# Patient Record
Sex: FEMALE | Race: WHITE | NOT HISPANIC OR LATINO | Employment: FULL TIME | URBAN - METROPOLITAN AREA
[De-identification: names, ages, dates, MRNs, and addresses within clinical notes are randomized per-mention and may not be internally consistent; named-entity substitution may affect disease eponyms.]

---

## 2021-10-04 ENCOUNTER — APPOINTMENT (OUTPATIENT)
Dept: RADIOLOGY | Facility: MEDICAL CENTER | Age: 26
DRG: 208 | End: 2021-10-04
Attending: INTERNAL MEDICINE
Payer: MEDICAID

## 2021-10-04 ENCOUNTER — HOSPITAL ENCOUNTER (INPATIENT)
Facility: MEDICAL CENTER | Age: 26
LOS: 7 days | DRG: 208 | End: 2021-10-11
Attending: EMERGENCY MEDICINE | Admitting: INTERNAL MEDICINE
Payer: MEDICAID

## 2021-10-04 ENCOUNTER — APPOINTMENT (OUTPATIENT)
Dept: RADIOLOGY | Facility: MEDICAL CENTER | Age: 26
DRG: 208 | End: 2021-10-04
Attending: EMERGENCY MEDICINE
Payer: MEDICAID

## 2021-10-04 DIAGNOSIS — J96.02 ACUTE RESPIRATORY FAILURE WITH HYPOXIA AND HYPERCAPNIA (HCC): ICD-10-CM

## 2021-10-04 DIAGNOSIS — J96.01 ACUTE RESPIRATORY FAILURE WITH HYPOXIA AND HYPERCAPNIA (HCC): ICD-10-CM

## 2021-10-04 DIAGNOSIS — J45.52 SEVERE PERSISTENT ASTHMA WITH STATUS ASTHMATICUS: ICD-10-CM

## 2021-10-04 DIAGNOSIS — J45.901 ASTHMA WITH ACUTE EXACERBATION, UNSPECIFIED ASTHMA SEVERITY, UNSPECIFIED WHETHER PERSISTENT: ICD-10-CM

## 2021-10-04 DIAGNOSIS — J96.01 ACUTE RESPIRATORY FAILURE WITH HYPOXIA (HCC): ICD-10-CM

## 2021-10-04 PROBLEM — J45.902 SEVERE ASTHMA WITH STATUS ASTHMATICUS: Status: ACTIVE | Noted: 2021-10-04

## 2021-10-04 LAB
ALBUMIN SERPL BCP-MCNC: 3.6 G/DL (ref 3.2–4.9)
ALBUMIN/GLOB SERPL: 0.9 G/DL
ALP SERPL-CCNC: 68 U/L (ref 30–99)
ALT SERPL-CCNC: 10 U/L (ref 2–50)
ANION GAP SERPL CALC-SCNC: 11 MMOL/L (ref 7–16)
AST SERPL-CCNC: 18 U/L (ref 12–45)
BASOPHILS # BLD AUTO: 0.3 % (ref 0–1.8)
BASOPHILS # BLD: 0.04 K/UL (ref 0–0.12)
BILIRUB SERPL-MCNC: <0.2 MG/DL (ref 0.1–1.5)
BUN SERPL-MCNC: 12 MG/DL (ref 8–22)
CALCIUM SERPL-MCNC: 8.5 MG/DL (ref 8.5–10.5)
CHLORIDE SERPL-SCNC: 104 MMOL/L (ref 96–112)
CO2 SERPL-SCNC: 22 MMOL/L (ref 20–33)
CREAT SERPL-MCNC: 0.73 MG/DL (ref 0.5–1.4)
EOSINOPHIL # BLD AUTO: 0.48 K/UL (ref 0–0.51)
EOSINOPHIL NFR BLD: 4 % (ref 0–6.9)
ERYTHROCYTE [DISTWIDTH] IN BLOOD BY AUTOMATED COUNT: 43.3 FL (ref 35.9–50)
GLOBULIN SER CALC-MCNC: 3.8 G/DL (ref 1.9–3.5)
GLUCOSE SERPL-MCNC: 163 MG/DL (ref 65–99)
HCG SERPL QL: NEGATIVE
HCT VFR BLD AUTO: 39.4 % (ref 37–47)
HGB BLD-MCNC: 12.9 G/DL (ref 12–16)
IMM GRANULOCYTES # BLD AUTO: 0.06 K/UL (ref 0–0.11)
IMM GRANULOCYTES NFR BLD AUTO: 0.5 % (ref 0–0.9)
LYMPHOCYTES # BLD AUTO: 4.11 K/UL (ref 1–4.8)
LYMPHOCYTES NFR BLD: 34.3 % (ref 22–41)
MCH RBC QN AUTO: 30.9 PG (ref 27–33)
MCHC RBC AUTO-ENTMCNC: 32.7 G/DL (ref 33.6–35)
MCV RBC AUTO: 94.3 FL (ref 81.4–97.8)
MONOCYTES # BLD AUTO: 0.96 K/UL (ref 0–0.85)
MONOCYTES NFR BLD AUTO: 8 % (ref 0–13.4)
NEUTROPHILS # BLD AUTO: 6.32 K/UL (ref 2–7.15)
NEUTROPHILS NFR BLD: 52.9 % (ref 44–72)
NRBC # BLD AUTO: 0 K/UL
NRBC BLD-RTO: 0 /100 WBC
PLATELET # BLD AUTO: 336 K/UL (ref 164–446)
PMV BLD AUTO: 9.1 FL (ref 9–12.9)
POTASSIUM SERPL-SCNC: 4.7 MMOL/L (ref 3.6–5.5)
PROT SERPL-MCNC: 7.4 G/DL (ref 6–8.2)
RBC # BLD AUTO: 4.18 M/UL (ref 4.2–5.4)
SODIUM SERPL-SCNC: 137 MMOL/L (ref 135–145)
WBC # BLD AUTO: 12 K/UL (ref 4.8–10.8)

## 2021-10-04 PROCEDURE — 82803 BLOOD GASES ANY COMBINATION: CPT

## 2021-10-04 PROCEDURE — 31500 INSERT EMERGENCY AIRWAY: CPT

## 2021-10-04 PROCEDURE — 700101 HCHG RX REV CODE 250: Performed by: INTERNAL MEDICINE

## 2021-10-04 PROCEDURE — 94660 CPAP INITIATION&MGMT: CPT

## 2021-10-04 PROCEDURE — 700111 HCHG RX REV CODE 636 W/ 250 OVERRIDE (IP): Performed by: EMERGENCY MEDICINE

## 2021-10-04 PROCEDURE — 85025 COMPLETE CBC W/AUTO DIFF WBC: CPT

## 2021-10-04 PROCEDURE — 99152 MOD SED SAME PHYS/QHP 5/>YRS: CPT

## 2021-10-04 PROCEDURE — 700102 HCHG RX REV CODE 250 W/ 637 OVERRIDE(OP): Performed by: INTERNAL MEDICINE

## 2021-10-04 PROCEDURE — 5A09357 ASSISTANCE WITH RESPIRATORY VENTILATION, LESS THAN 24 CONSECUTIVE HOURS, CONTINUOUS POSITIVE AIRWAY PRESSURE: ICD-10-PCS | Performed by: INTERNAL MEDICINE

## 2021-10-04 PROCEDURE — 99153 MOD SED SAME PHYS/QHP EA: CPT

## 2021-10-04 PROCEDURE — 94640 AIRWAY INHALATION TREATMENT: CPT

## 2021-10-04 PROCEDURE — 700111 HCHG RX REV CODE 636 W/ 250 OVERRIDE (IP)

## 2021-10-04 PROCEDURE — 84703 CHORIONIC GONADOTROPIN ASSAY: CPT

## 2021-10-04 PROCEDURE — 94002 VENT MGMT INPAT INIT DAY: CPT

## 2021-10-04 PROCEDURE — 80053 COMPREHEN METABOLIC PANEL: CPT

## 2021-10-04 PROCEDURE — 770022 HCHG ROOM/CARE - ICU (200)

## 2021-10-04 PROCEDURE — 96372 THER/PROPH/DIAG INJ SC/IM: CPT

## 2021-10-04 PROCEDURE — 700101 HCHG RX REV CODE 250

## 2021-10-04 PROCEDURE — 99291 CRITICAL CARE FIRST HOUR: CPT | Performed by: INTERNAL MEDICINE

## 2021-10-04 PROCEDURE — 99292 CRITICAL CARE ADDL 30 MIN: CPT | Performed by: INTERNAL MEDICINE

## 2021-10-04 PROCEDURE — A9270 NON-COVERED ITEM OR SERVICE: HCPCS | Performed by: INTERNAL MEDICINE

## 2021-10-04 PROCEDURE — 99291 CRITICAL CARE FIRST HOUR: CPT

## 2021-10-04 PROCEDURE — 700111 HCHG RX REV CODE 636 W/ 250 OVERRIDE (IP): Performed by: INTERNAL MEDICINE

## 2021-10-04 PROCEDURE — 304155 HCHG HELIOX Q2H

## 2021-10-04 PROCEDURE — 700105 HCHG RX REV CODE 258: Performed by: EMERGENCY MEDICINE

## 2021-10-04 PROCEDURE — 71045 X-RAY EXAM CHEST 1 VIEW: CPT

## 2021-10-04 PROCEDURE — 700105 HCHG RX REV CODE 258: Performed by: INTERNAL MEDICINE

## 2021-10-04 PROCEDURE — 94760 N-INVAS EAR/PLS OXIMETRY 1: CPT

## 2021-10-04 PROCEDURE — 700101 HCHG RX REV CODE 250: Performed by: EMERGENCY MEDICINE

## 2021-10-04 PROCEDURE — 96374 THER/PROPH/DIAG INJ IV PUSH: CPT

## 2021-10-04 RX ORDER — PROMETHAZINE HYDROCHLORIDE 25 MG/1
12.5-25 TABLET ORAL EVERY 4 HOURS PRN
Status: DISCONTINUED | OUTPATIENT
Start: 2021-10-04 | End: 2021-10-09

## 2021-10-04 RX ORDER — MIDAZOLAM HYDROCHLORIDE 1 MG/ML
2-4 INJECTION INTRAMUSCULAR; INTRAVENOUS EVERY 4 HOURS PRN
Status: DISCONTINUED | OUTPATIENT
Start: 2021-10-04 | End: 2021-10-06

## 2021-10-04 RX ORDER — MAGNESIUM SULFATE HEPTAHYDRATE 40 MG/ML
2 INJECTION, SOLUTION INTRAVENOUS ONCE
Status: COMPLETED | OUTPATIENT
Start: 2021-10-04 | End: 2021-10-04

## 2021-10-04 RX ORDER — LORAZEPAM 2 MG/ML
INJECTION INTRAMUSCULAR
Status: COMPLETED
Start: 2021-10-04 | End: 2021-10-04

## 2021-10-04 RX ORDER — SODIUM CHLORIDE 9 MG/ML
1000 INJECTION, SOLUTION INTRAVENOUS ONCE
Status: COMPLETED | OUTPATIENT
Start: 2021-10-04 | End: 2021-10-04

## 2021-10-04 RX ORDER — LORAZEPAM 2 MG/ML
0.5 INJECTION INTRAMUSCULAR ONCE
Status: COMPLETED | OUTPATIENT
Start: 2021-10-04 | End: 2021-10-04

## 2021-10-04 RX ORDER — METHYLPREDNISOLONE SODIUM SUCCINATE 125 MG/2ML
62.5 INJECTION, POWDER, LYOPHILIZED, FOR SOLUTION INTRAMUSCULAR; INTRAVENOUS EVERY 6 HOURS
Status: DISCONTINUED | OUTPATIENT
Start: 2021-10-04 | End: 2021-10-09

## 2021-10-04 RX ORDER — CALCIUM CHLORIDE 100 MG/ML
INJECTION INTRAVENOUS; INTRAVENTRICULAR
Status: ACTIVE
Start: 2021-10-04 | End: 2021-10-05

## 2021-10-04 RX ORDER — ACETAMINOPHEN 325 MG/1
650 TABLET ORAL EVERY 6 HOURS PRN
Status: DISCONTINUED | OUTPATIENT
Start: 2021-10-04 | End: 2021-10-11 | Stop reason: HOSPADM

## 2021-10-04 RX ORDER — AMOXICILLIN 250 MG
2 CAPSULE ORAL 2 TIMES DAILY
Status: DISCONTINUED | OUTPATIENT
Start: 2021-10-04 | End: 2021-10-04

## 2021-10-04 RX ORDER — KETAMINE HYDROCHLORIDE 50 MG/ML
INJECTION, SOLUTION INTRAMUSCULAR; INTRAVENOUS
Status: COMPLETED
Start: 2021-10-04 | End: 2021-10-04

## 2021-10-04 RX ORDER — IPRATROPIUM BROMIDE AND ALBUTEROL SULFATE 2.5; .5 MG/3ML; MG/3ML
SOLUTION RESPIRATORY (INHALATION)
Status: COMPLETED
Start: 2021-10-04 | End: 2021-10-04

## 2021-10-04 RX ORDER — GUAIFENESIN/DEXTROMETHORPHAN 100-10MG/5
10 SYRUP ORAL EVERY 6 HOURS PRN
Status: DISCONTINUED | OUTPATIENT
Start: 2021-10-04 | End: 2021-10-11 | Stop reason: HOSPADM

## 2021-10-04 RX ORDER — LEVALBUTEROL INHALATION SOLUTION 1.25 MG/3ML
1.25 SOLUTION RESPIRATORY (INHALATION)
Status: COMPLETED | OUTPATIENT
Start: 2021-10-04 | End: 2021-10-04

## 2021-10-04 RX ORDER — GUAIFENESIN, PSEUDOEPHEDRINE HYDROCHLORIDE 600; 60 MG/1; MG/1
1 TABLET, EXTENDED RELEASE ORAL EVERY 12 HOURS
COMMUNITY

## 2021-10-04 RX ORDER — ROCURONIUM BROMIDE 10 MG/ML
0.6 INJECTION, SOLUTION INTRAVENOUS ONCE
Status: COMPLETED | OUTPATIENT
Start: 2021-10-04 | End: 2021-10-05

## 2021-10-04 RX ORDER — KETAMINE HYDROCHLORIDE 50 MG/ML
0.5 INJECTION, SOLUTION INTRAMUSCULAR; INTRAVENOUS
Status: COMPLETED | OUTPATIENT
Start: 2021-10-04 | End: 2021-10-04

## 2021-10-04 RX ORDER — IPRATROPIUM BROMIDE AND ALBUTEROL SULFATE 2.5; .5 MG/3ML; MG/3ML
3 SOLUTION RESPIRATORY (INHALATION)
Status: COMPLETED | OUTPATIENT
Start: 2021-10-04 | End: 2021-10-04

## 2021-10-04 RX ORDER — MIDAZOLAM HYDROCHLORIDE 1 MG/ML
INJECTION INTRAMUSCULAR; INTRAVENOUS
Status: COMPLETED
Start: 2021-10-04 | End: 2021-10-04

## 2021-10-04 RX ORDER — KETAMINE HYDROCHLORIDE 50 MG/ML
50 INJECTION, SOLUTION INTRAMUSCULAR; INTRAVENOUS ONCE
Status: COMPLETED | OUTPATIENT
Start: 2021-10-04 | End: 2021-10-04

## 2021-10-04 RX ORDER — MIDAZOLAM HYDROCHLORIDE 1 MG/ML
1 INJECTION INTRAMUSCULAR; INTRAVENOUS ONCE
Status: DISCONTINUED | OUTPATIENT
Start: 2021-10-04 | End: 2021-10-04

## 2021-10-04 RX ORDER — FAMOTIDINE 20 MG/1
20 TABLET, FILM COATED ORAL EVERY 12 HOURS
Status: DISCONTINUED | OUTPATIENT
Start: 2021-10-04 | End: 2021-10-07

## 2021-10-04 RX ORDER — BISACODYL 10 MG
10 SUPPOSITORY, RECTAL RECTAL
Status: DISCONTINUED | OUTPATIENT
Start: 2021-10-04 | End: 2021-10-11 | Stop reason: HOSPADM

## 2021-10-04 RX ORDER — POLYETHYLENE GLYCOL 3350 17 G/17G
1 POWDER, FOR SOLUTION ORAL
Status: DISCONTINUED | OUTPATIENT
Start: 2021-10-04 | End: 2021-10-04

## 2021-10-04 RX ORDER — ONDANSETRON 4 MG/1
4 TABLET, ORALLY DISINTEGRATING ORAL EVERY 4 HOURS PRN
Status: DISCONTINUED | OUTPATIENT
Start: 2021-10-04 | End: 2021-10-11 | Stop reason: HOSPADM

## 2021-10-04 RX ORDER — PROCHLORPERAZINE EDISYLATE 5 MG/ML
5-10 INJECTION INTRAMUSCULAR; INTRAVENOUS EVERY 4 HOURS PRN
Status: DISCONTINUED | OUTPATIENT
Start: 2021-10-04 | End: 2021-10-09

## 2021-10-04 RX ORDER — ONDANSETRON 2 MG/ML
4 INJECTION INTRAMUSCULAR; INTRAVENOUS EVERY 4 HOURS PRN
Status: DISCONTINUED | OUTPATIENT
Start: 2021-10-04 | End: 2021-10-11 | Stop reason: HOSPADM

## 2021-10-04 RX ORDER — DEXMEDETOMIDINE HYDROCHLORIDE 4 UG/ML
.1-1.5 INJECTION, SOLUTION INTRAVENOUS CONTINUOUS
Status: DISCONTINUED | OUTPATIENT
Start: 2021-10-04 | End: 2021-10-04

## 2021-10-04 RX ORDER — AMOXICILLIN 250 MG
2 CAPSULE ORAL 2 TIMES DAILY
Status: DISCONTINUED | OUTPATIENT
Start: 2021-10-05 | End: 2021-10-11 | Stop reason: HOSPADM

## 2021-10-04 RX ORDER — POLYETHYLENE GLYCOL 3350 17 G/17G
1 POWDER, FOR SOLUTION ORAL
Status: DISCONTINUED | OUTPATIENT
Start: 2021-10-04 | End: 2021-10-11 | Stop reason: HOSPADM

## 2021-10-04 RX ORDER — PROMETHAZINE HYDROCHLORIDE 25 MG/1
12.5-25 SUPPOSITORY RECTAL EVERY 4 HOURS PRN
Status: DISCONTINUED | OUTPATIENT
Start: 2021-10-04 | End: 2021-10-09

## 2021-10-04 RX ORDER — ROCURONIUM BROMIDE 10 MG/ML
0.6 INJECTION, SOLUTION INTRAVENOUS
Status: DISCONTINUED | OUTPATIENT
Start: 2021-10-04 | End: 2021-10-06

## 2021-10-04 RX ORDER — EPINEPHRINE 1 MG/ML(1)
0.3 AMPUL (ML) INJECTION ONCE
Status: COMPLETED | OUTPATIENT
Start: 2021-10-04 | End: 2021-10-04

## 2021-10-04 RX ORDER — BISACODYL 10 MG
10 SUPPOSITORY, RECTAL RECTAL
Status: DISCONTINUED | OUTPATIENT
Start: 2021-10-04 | End: 2021-10-04

## 2021-10-04 RX ORDER — ALBUTEROL SULFATE 90 UG/1
2 AEROSOL, METERED RESPIRATORY (INHALATION)
Status: DISCONTINUED | OUTPATIENT
Start: 2021-10-04 | End: 2021-10-11 | Stop reason: HOSPADM

## 2021-10-04 RX ADMIN — LORAZEPAM 0.5 MG: 2 INJECTION INTRAMUSCULAR at 16:45

## 2021-10-04 RX ADMIN — LORAZEPAM 0.5 MG: 2 INJECTION INTRAMUSCULAR; INTRAVENOUS at 16:45

## 2021-10-04 RX ADMIN — ALBUTEROL SULFATE 10 MG/HR: 5 SOLUTION RESPIRATORY (INHALATION) at 18:46

## 2021-10-04 RX ADMIN — MIDAZOLAM HYDROCHLORIDE 4 MG: 1 INJECTION INTRAMUSCULAR; INTRAVENOUS at 23:54

## 2021-10-04 RX ADMIN — Medication 2 MG/HR: at 23:56

## 2021-10-04 RX ADMIN — EPINEPHRINE 0.3 MG: 1 INJECTION INTRAMUSCULAR; INTRAVENOUS; SUBCUTANEOUS at 16:06

## 2021-10-04 RX ADMIN — SODIUM CHLORIDE 1000 ML: 9 INJECTION, SOLUTION INTRAVENOUS at 16:45

## 2021-10-04 RX ADMIN — FAMOTIDINE 20 MG: 10 INJECTION INTRAVENOUS at 23:55

## 2021-10-04 RX ADMIN — MINERAL OIL, PETROLATUM 1 APPLICATION: 425; 573 OINTMENT OPHTHALMIC at 23:55

## 2021-10-04 RX ADMIN — ALBUTEROL SULFATE 2 PUFF: 90 AEROSOL, METERED RESPIRATORY (INHALATION) at 20:09

## 2021-10-04 RX ADMIN — METHYLPREDNISOLONE SODIUM SUCCINATE 62.5 MG: 125 INJECTION, POWDER, FOR SOLUTION INTRAMUSCULAR; INTRAVENOUS at 23:55

## 2021-10-04 RX ADMIN — KETAMINE HYDROCHLORIDE 50 MG: 50 INJECTION INTRAMUSCULAR; INTRAVENOUS at 21:29

## 2021-10-04 RX ADMIN — LEVALBUTEROL 2.5 MG: 1.25 SOLUTION RESPIRATORY (INHALATION) at 16:14

## 2021-10-04 RX ADMIN — SODIUM CHLORIDE 1 MG/KG/HR: 9 INJECTION, SOLUTION INTRAVENOUS at 20:15

## 2021-10-04 RX ADMIN — LORAZEPAM 0.5 MG: 2 INJECTION INTRAMUSCULAR; INTRAVENOUS at 21:54

## 2021-10-04 RX ADMIN — ALBUTEROL SULFATE 10 MG/HR: 5 SOLUTION RESPIRATORY (INHALATION) at 17:14

## 2021-10-04 RX ADMIN — KETAMINE HYDROCHLORIDE 50 MG: 50 INJECTION, SOLUTION INTRAMUSCULAR; INTRAVENOUS at 21:29

## 2021-10-04 RX ADMIN — MIDAZOLAM HYDROCHLORIDE 4 MG: 1 INJECTION, SOLUTION INTRAMUSCULAR; INTRAVENOUS at 23:54

## 2021-10-04 RX ADMIN — IPRATROPIUM BROMIDE AND ALBUTEROL SULFATE 3 ML: .5; 2.5 SOLUTION RESPIRATORY (INHALATION) at 16:43

## 2021-10-04 RX ADMIN — IPRATROPIUM BROMIDE AND ALBUTEROL SULFATE 3 ML: .5; 2.5 SOLUTION RESPIRATORY (INHALATION) at 17:03

## 2021-10-04 RX ADMIN — METHYLPREDNISOLONE SODIUM SUCCINATE 62.5 MG: 125 INJECTION, POWDER, FOR SOLUTION INTRAMUSCULAR; INTRAVENOUS at 18:40

## 2021-10-04 RX ADMIN — MAGNESIUM SULFATE HEPTAHYDRATE 2 G: 40 INJECTION, SOLUTION INTRAVENOUS at 18:40

## 2021-10-04 RX ADMIN — KETAMINE HYDROCHLORIDE 33 MG: 50 INJECTION INTRAMUSCULAR; INTRAVENOUS at 18:40

## 2021-10-04 RX ADMIN — DEXMEDETOMIDINE 0.6 MCG/KG/HR: 200 INJECTION, SOLUTION INTRAVENOUS at 22:20

## 2021-10-04 ASSESSMENT — LIFESTYLE VARIABLES
EVER HAD A DRINK FIRST THING IN THE MORNING TO STEADY YOUR NERVES TO GET RID OF A HANGOVER: NO
HAVE PEOPLE ANNOYED YOU BY CRITICIZING YOUR DRINKING: NO
CONSUMPTION TOTAL: POSITIVE
AVERAGE NUMBER OF DAYS PER WEEK YOU HAVE A DRINK CONTAINING ALCOHOL: 1
HOW MANY TIMES IN THE PAST YEAR HAVE YOU HAD 5 OR MORE DRINKS IN A DAY: 1
HAVE YOU EVER FELT YOU SHOULD CUT DOWN ON YOUR DRINKING: NO
TOTAL SCORE: 0
ON A TYPICAL DAY WHEN YOU DRINK ALCOHOL HOW MANY DRINKS DO YOU HAVE: 1
DOES PATIENT WANT TO STOP DRINKING: NO
EVER FELT BAD OR GUILTY ABOUT YOUR DRINKING: NO
ALCOHOL_USE: YES

## 2021-10-04 ASSESSMENT — COGNITIVE AND FUNCTIONAL STATUS - GENERAL
DAILY ACTIVITIY SCORE: 24
MOBILITY SCORE: 24
SUGGESTED CMS G CODE MODIFIER DAILY ACTIVITY: CH
SUGGESTED CMS G CODE MODIFIER MOBILITY: CH

## 2021-10-04 ASSESSMENT — PATIENT HEALTH QUESTIONNAIRE - PHQ9
2. FEELING DOWN, DEPRESSED, IRRITABLE, OR HOPELESS: NOT AT ALL
1. LITTLE INTEREST OR PLEASURE IN DOING THINGS: NOT AT ALL
SUM OF ALL RESPONSES TO PHQ9 QUESTIONS 1 AND 2: 0

## 2021-10-04 ASSESSMENT — COPD QUESTIONNAIRES
DURING THE PAST 4 WEEKS HOW MUCH DID YOU FEEL SHORT OF BREATH: NONE/LITTLE OF THE TIME
DO YOU EVER COUGH UP ANY MUCUS OR PHLEGM?: NO/ONLY WITH OCCASIONAL COLDS OR INFECTIONS
COPD SCREENING SCORE: 2
HAVE YOU SMOKED AT LEAST 100 CIGARETTES IN YOUR ENTIRE LIFE: YES

## 2021-10-04 ASSESSMENT — PULMONARY FUNCTION TESTS
EPAP_CMH2O: 6
EPAP_CMH2O: 6

## 2021-10-04 NOTE — ED TRIAGE NOTES
Chief Complaint   Patient presents with   • Asthma     history of, 80% on room air. took albuterol x5 at home and given 3 albuterol by EMS. 2 duonebs given by EMS   • Shortness of Breath     increasing since last monday     Pt BIBA from home for an acute asthma exacerbation. Pt on 15L NRB upon arrival with increased work of breathing and tachypnea. Pt reports having cold like symptoms since last Monday and reports receiving first dose of covid pfizer vaccine on 9/25.  ERP and respiratory called to bedside. Pt placed on BIPAP.

## 2021-10-04 NOTE — ED PROVIDER NOTES
ED Provider Note    Scribed for Anna Humphrey M.D. by Real Oseguera. 10/4/2021, 4:22 PM.    Primary care provider: No primary care provider noted  Means of arrival: Ambulance   History obtained from: EMS and Patient  History limited by: Clinical Condition     CHIEF COMPLAINT  Chief Complaint   Patient presents with   • Asthma     history of, 80% on room air. took albuterol x5 at home and given 3 albuterol by EMS. 2 duonebs given by EMS   • Shortness of Breath     increasing since last monday     ROGER Jimenez is a 26 y.o. female with history of asthma who presents to the Emergency Department by ambulance for evaluation of worsening severe asthma exacerbation onset about 1 week ago. Per EMS, the patient was short of breath, diffusely mottled and hypoxic on room air at 40% upon arrival. She had self medicated with 5 doses of her albuterol inhaler at home today, and EMS treated the patient with 2 g magnesium, 3 doses albuterol, 2 duoneb, and 125 mg Solu-Medrol en route. She has also experienced cold-like symptoms for about a week. She notes she received her first Pfizer COVID vaccine on 9/22/21.     History limited by: Clinical Condition     REVIEW OF SYSTEMS  Pertinent positives include shortness of breath, mottled skin, hypoxia, cold-like symptoms.     ROS limited by: Clinical Condition     PAST MEDICAL HISTORY   Asthma    SURGICAL HISTORY  patient denies any surgical history    SOCIAL HISTORY  Social History     Tobacco Use   • Smoking status: Never Smoker   • Smokeless tobacco: Never Used   Substance Use Topics   • Alcohol use: Never   • Drug use: Never      Social History     Substance and Sexual Activity   Drug Use Never       FAMILY HISTORY  History reviewed. No pertinent family history.    CURRENT MEDICATIONS  Home Medications     Reviewed by Luly Carvalho (Pharmacy Tech) on 10/04/21 at 2810  Med List Status: Complete   Medication Last Dose Status   ALBUTEROL SULFATE PO 10/4/2021 Active  "  pseudoephedrine-guaifenesin (MUCINEX D)  MG per tablet 10/3/2021 Active                ALLERGIES  No Known Allergies    PHYSICAL EXAM  VITAL SIGNS: /68   Pulse (!) 153   Temp 37.7 °C (99.8 °F) (Temporal)   Resp (!) 38   Ht 1.626 m (5' 4\")   Wt 65.8 kg (145 lb)   SpO2 95%   BMI 24.89 kg/m²   Constitutional: Alert. Severe respiratory distress.   HENT: No signs of trauma, Bilateral external ears normal, Nose normal.   Eyes: Pupils are equal and reactive, Conjunctiva normal, Non-icteric.   Neck: Normal range of motion, No tenderness, Supple, No stridor.   Cardiovascular: Tachycardic rate and normal rhythm, no murmurs.   Thorax & Lungs: Severe respiratory distress, decreased air movement, inspiratory and expiratory wheezing.   Skin: Warm, Dry, No erythema, No rash.   Musculoskeletal:  No major deformities noted.   Neurologic: Alert, moving all extremities without difficulty, no focal deficits.    LABS  Labs Reviewed   CBC WITH DIFFERENTIAL - Abnormal; Notable for the following components:       Result Value    WBC 12.0 (*)     RBC 4.18 (*)     MCHC 32.7 (*)     Monos (Absolute) 0.96 (*)     All other components within normal limits   COMP METABOLIC PANEL - Abnormal; Notable for the following components:    Glucose 163 (*)     Globulin 3.8 (*)     All other components within normal limits   HCG QUAL SERUM   ESTIMATED GFR   RESPIRATORY PANEL, PCR (W/SARS COV-2)   URINALYSIS   HCG QUALITATIVE UR   URINE DRUG SCREEN   CBC WITHOUT DIFFERENTIAL   COMP METABOLIC PANEL   MAGNESIUM   PHOSPHORUS   POCT VENOUS BLOOD GAS     All labs reviewed by me.    RADIOLOGY  DX-CHEST-LIMITED (1 VIEW)   Final Result      No acute cardiopulmonary disease.        The radiologist's interpretation of all radiological studies have been reviewed by me.    CRITICAL CARE NOTE:  Critical care time was provided for 45 minutes minutes exclusive of separately billable procedures and treating other patients. This involved direct bedside " patient care, speaking with family members, review of past medical records, reviewing the results of the laboratory and diagnostic studies, consulting with other physicians, as well as evaluating the effectiveness of the therapy instituted as described.      COURSE & MEDICAL DECISION MAKING  Pertinent Labs & Imaging studies reviewed. (See chart for details)    Differential diagnoses include but are not limited to: Respiratory Failure, asthma exacerbation    3:55 PM - Patient seen and examined at bedside. Patient was placed on 15 L non-rebreather upon arrival, then changed to BiPAP. Patient was be treated with epinephrine 0.3 mg IM, Xopenex 1.25 mg nebulizer solution, albuterol nebulizer solution, Ativan 1 mg, and 1 L NS IV. Ordered DX-Chest, CBC with diff, CMP to evaluate her symptoms.     4:37 PM - The patient will be treated with Duoneb nebulizer solution.     4:52 PM - Patient was started on high flow Heliox.     4:58 PM Paged Intensivist.     5:07 PM I discussed the patient's case and the above findings with Dr. Ham (Intensivist) who will assess the patient for hospitalization.     HYDRATION: Based on the patient's presentation of Tachycardia the patient was given IV fluids. IV Hydration was used because oral hydration was not adequate alone. Upon recheck following hydration, the patient was improved.    Decision Making:  This is a 26 y.o. year old female who presents with severe respiratory distress likely secondary to asthma exacerbation.  Patient arrived in extremis she was really moving minimal air and had significant diffuse wheezing.    She was initially placed on BiPAP she was given 0.3 mils of IM epi as she had already been given Solu-Medrol and magnesium.  The epinephrine really did not seem to help much.  She was placed on BiPAP initially for her respiratory distress to help her work of breathing and delivered Xopenex nebulizer treatments x2.  She had some improvement with her oxygenation and work  of breathing however her respiratory rate was still in the 50s to 60s.  She was therefore then switched to high flow nasal cannula with heliox.  Additional continuous nebulizers were delivered through this.  She had some significant improvement of her respiratory rate down to the high 20s low 30s on this with sats in the mid 90s.  She did well on this per little while and then had increased wheezing and tightness again with sats dropping into 88%.  Dr. Ham ordered an albuterol syringe to give continuous albuterol.  Patient is critically ill at this time she will require ICU level admission.    DISPOSITION:  Patient will be hospitalized by Dr. Ham in critical condition.     FINAL IMPRESSION  1. Asthma with acute exacerbation, unspecified asthma severity, unspecified whether persistent    2. Acute respiratory failure with hypoxia (HCC)               This dictation has been created using voice recognition software and/or scribes. The accuracy of the dictation is limited by the abilities of the software and the expertise of the scribes. I expect there may be some errors of grammar and possibly content. I made every attempt to manually correct the errors within my dictation. However, errors related to voice recognition software and/or scribes may still exist and should be interpreted within the appropriate context.     Real WISDOM (Scribe), am scribing for, and in the presence of, Anna Humphrey M.D..    Electronically signed by: Real Oseguera (Scribe), 10/4/2021    Anna WISDOM M.D. personally performed the services described in this documentation, as scribed by Real Oseguera in my presence, and it is both accurate and complete.    The note accurately reflects work and decisions made by me.  Anna Humphrey M.D.  10/4/2021  6:14 PM

## 2021-10-05 ENCOUNTER — APPOINTMENT (OUTPATIENT)
Dept: RADIOLOGY | Facility: MEDICAL CENTER | Age: 26
DRG: 208 | End: 2021-10-05
Attending: INTERNAL MEDICINE
Payer: MEDICAID

## 2021-10-05 PROBLEM — J96.01 ACUTE RESPIRATORY FAILURE WITH HYPOXIA AND HYPERCAPNIA (HCC): Status: ACTIVE | Noted: 2021-10-05

## 2021-10-05 PROBLEM — J96.02 ACUTE RESPIRATORY FAILURE WITH HYPOXIA AND HYPERCAPNIA (HCC): Status: ACTIVE | Noted: 2021-10-05

## 2021-10-05 LAB
ALBUMIN SERPL BCP-MCNC: 3.9 G/DL (ref 3.2–4.9)
ALBUMIN/GLOB SERPL: 1 G/DL
ALP SERPL-CCNC: 69 U/L (ref 30–99)
ALT SERPL-CCNC: 12 U/L (ref 2–50)
AMPHET UR QL SCN: POSITIVE
ANION GAP SERPL CALC-SCNC: 11 MMOL/L (ref 7–16)
ANION GAP SERPL CALC-SCNC: 16 MMOL/L (ref 7–16)
APPEARANCE UR: ABNORMAL
AST SERPL-CCNC: 22 U/L (ref 12–45)
BACTERIA #/AREA URNS HPF: ABNORMAL /HPF
BARBITURATES UR QL SCN: NEGATIVE
BASE EXCESS BLDA CALC-SCNC: -10 MMOL/L (ref -4–3)
BASE EXCESS BLDA CALC-SCNC: -11 MMOL/L (ref -4–3)
BASE EXCESS BLDA CALC-SCNC: -13 MMOL/L (ref -4–3)
BASE EXCESS BLDA CALC-SCNC: -6 MMOL/L (ref -4–3)
BASE EXCESS BLDA CALC-SCNC: -7 MMOL/L (ref -4–3)
BASE EXCESS BLDV CALC-SCNC: -10 MMOL/L (ref -4–3)
BENZODIAZ UR QL SCN: POSITIVE
BILIRUB SERPL-MCNC: <0.2 MG/DL (ref 0.1–1.5)
BILIRUB UR QL STRIP.AUTO: NEGATIVE
BODY TEMPERATURE: ABNORMAL DEGREES
BREATHS SETTING VENT: 24
BREATHS SETTING VENT: 28
BREATHS SETTING VENT: 28
BREATHS SETTING VENT: 32
BUN SERPL-MCNC: 10 MG/DL (ref 8–22)
BUN SERPL-MCNC: 16 MG/DL (ref 8–22)
BZE UR QL SCN: NEGATIVE
CALCIUM SERPL-MCNC: 7.9 MG/DL (ref 8.5–10.5)
CALCIUM SERPL-MCNC: 8.1 MG/DL (ref 8.5–10.5)
CANNABINOIDS UR QL SCN: NEGATIVE
CHLORIDE SERPL-SCNC: 109 MMOL/L (ref 96–112)
CHLORIDE SERPL-SCNC: 112 MMOL/L (ref 96–112)
CO2 BLDA-SCNC: 18 MMOL/L (ref 20–33)
CO2 BLDA-SCNC: 19 MMOL/L (ref 20–33)
CO2 BLDA-SCNC: 21 MMOL/L (ref 20–33)
CO2 BLDA-SCNC: 22 MMOL/L (ref 20–33)
CO2 BLDA-SCNC: 24 MMOL/L (ref 20–33)
CO2 BLDV-SCNC: 21 MMOL/L (ref 20–33)
CO2 SERPL-SCNC: 15 MMOL/L (ref 20–33)
CO2 SERPL-SCNC: 18 MMOL/L (ref 20–33)
COLOR UR: YELLOW
CREAT SERPL-MCNC: 0.73 MG/DL (ref 0.5–1.4)
CREAT SERPL-MCNC: 0.78 MG/DL (ref 0.5–1.4)
DELSYS IDSYS: ABNORMAL
END TIDAL CARBON DIOXIDE IECO2: 19 MMHG
END TIDAL CARBON DIOXIDE IECO2: 23 MMHG
END TIDAL CARBON DIOXIDE IECO2: 30 MMHG
END TIDAL CARBON DIOXIDE IECO2: 31 MMHG
END TIDAL CARBON DIOXIDE IECO2: 37 MMHG
EPI CELLS #/AREA URNS HPF: ABNORMAL /HPF
ERYTHROCYTE [DISTWIDTH] IN BLOOD BY AUTOMATED COUNT: 43.9 FL (ref 35.9–50)
FLUAV RNA SPEC QL NAA+PROBE: NEGATIVE
FLUBV RNA SPEC QL NAA+PROBE: NEGATIVE
GLOBULIN SER CALC-MCNC: 3.9 G/DL (ref 1.9–3.5)
GLUCOSE BLD-MCNC: 158 MG/DL (ref 65–99)
GLUCOSE BLD-MCNC: 184 MG/DL (ref 65–99)
GLUCOSE SERPL-MCNC: 177 MG/DL (ref 65–99)
GLUCOSE SERPL-MCNC: 216 MG/DL (ref 65–99)
GLUCOSE UR STRIP.AUTO-MCNC: NEGATIVE MG/DL
HCO3 BLDA-SCNC: 16.4 MMOL/L (ref 17–25)
HCO3 BLDA-SCNC: 17.2 MMOL/L (ref 17–25)
HCO3 BLDA-SCNC: 19.6 MMOL/L (ref 17–25)
HCO3 BLDA-SCNC: 19.7 MMOL/L (ref 17–25)
HCO3 BLDA-SCNC: 22 MMOL/L (ref 17–25)
HCO3 BLDV-SCNC: 19.4 MMOL/L (ref 24–28)
HCT VFR BLD AUTO: 34.8 % (ref 37–47)
HGB BLD-MCNC: 11.6 G/DL (ref 12–16)
HOROWITZ INDEX BLDA+IHG-RTO: 167 MM[HG]
HOROWITZ INDEX BLDA+IHG-RTO: 232 MM[HG]
HOROWITZ INDEX BLDA+IHG-RTO: 260 MM[HG]
HOROWITZ INDEX BLDA+IHG-RTO: 272 MM[HG]
HOROWITZ INDEX BLDV+IHG-RTO: 192 MM[HG]
HYALINE CASTS #/AREA URNS LPF: ABNORMAL /LPF
INR PPP: 1.13 (ref 0.87–1.13)
KETONES UR STRIP.AUTO-MCNC: ABNORMAL MG/DL
LACTATE BLD-SCNC: 1.2 MMOL/L (ref 0.5–2)
LACTATE BLD-SCNC: 1.8 MMOL/L (ref 0.5–2)
LACTATE BLD-SCNC: 3.6 MMOL/L (ref 0.5–2)
LACTATE BLD-SCNC: 6.8 MMOL/L (ref 0.5–2)
LEUKOCYTE ESTERASE UR QL STRIP.AUTO: NEGATIVE
MAGNESIUM SERPL-MCNC: 2.3 MG/DL (ref 1.5–2.5)
MCH RBC QN AUTO: 31.7 PG (ref 27–33)
MCHC RBC AUTO-ENTMCNC: 33.3 G/DL (ref 33.6–35)
MCV RBC AUTO: 95.1 FL (ref 81.4–97.8)
METHADONE UR QL SCN: NEGATIVE
MICRO URNS: ABNORMAL
MODE IMODE: ABNORMAL
NITRITE UR QL STRIP.AUTO: NEGATIVE
O2/TOTAL GAS SETTING VFR VENT: 100 %
O2/TOTAL GAS SETTING VFR VENT: 40 %
O2/TOTAL GAS SETTING VFR VENT: 50 %
O2/TOTAL GAS SETTING VFR VENT: 50 %
O2/TOTAL GAS SETTING VFR VENT: 60 %
OPIATES UR QL SCN: POSITIVE
OXYCODONE UR QL SCN: POSITIVE
PCO2 BLDA: 43.4 MMHG (ref 26–37)
PCO2 BLDA: 45.1 MMHG (ref 26–37)
PCO2 BLDA: 51.6 MMHG (ref 26–37)
PCO2 BLDA: 53.6 MMHG (ref 26–37)
PCO2 BLDA: 60.8 MMHG (ref 26–37)
PCO2 BLDV: 56.2 MMHG (ref 41–51)
PCO2 TEMP ADJ BLDA: 41.2 MMHG (ref 26–37)
PCO2 TEMP ADJ BLDA: 42.4 MMHG (ref 26–37)
PCO2 TEMP ADJ BLDA: 48.9 MMHG (ref 26–37)
PCO2 TEMP ADJ BLDA: 52.4 MMHG (ref 26–37)
PCO2 TEMP ADJ BLDA: 60.4 MMHG (ref 26–37)
PCO2 TEMP ADJ BLDV: 55.4 MMHG (ref 41–51)
PCP UR QL SCN: POSITIVE
PEEP END EXPIRATORY PRESSURE IPEEP: 10 CMH20
PEEP END EXPIRATORY PRESSURE IPEEP: 8 CMH20
PH BLDA: 7.09 [PH] (ref 7.4–7.5)
PH BLDA: 7.12 [PH] (ref 7.4–7.5)
PH BLDA: 7.19 [PH] (ref 7.4–7.5)
PH BLDA: 7.24 [PH] (ref 7.4–7.5)
PH BLDA: 7.26 [PH] (ref 7.4–7.5)
PH BLDV: 7.15 [PH] (ref 7.31–7.45)
PH TEMP ADJ BLDA: 7.1 [PH] (ref 7.4–7.5)
PH TEMP ADJ BLDA: 7.12 [PH] (ref 7.4–7.5)
PH TEMP ADJ BLDA: 7.21 [PH] (ref 7.4–7.5)
PH TEMP ADJ BLDA: 7.25 [PH] (ref 7.4–7.5)
PH TEMP ADJ BLDA: 7.28 [PH] (ref 7.4–7.5)
PH TEMP ADJ BLDV: 7.15 [PH] (ref 7.31–7.45)
PH UR STRIP.AUTO: 5 [PH] (ref 5–8)
PHOSPHATE SERPL-MCNC: 4.6 MG/DL (ref 2.5–4.5)
PLATELET # BLD AUTO: 264 K/UL (ref 164–446)
PMV BLD AUTO: 9 FL (ref 9–12.9)
PO2 BLDA: 100 MMHG (ref 64–87)
PO2 BLDA: 104 MMHG (ref 64–87)
PO2 BLDA: 116 MMHG (ref 64–87)
PO2 BLDA: 136 MMHG (ref 64–87)
PO2 BLDA: 142 MMHG (ref 64–87)
PO2 BLDV: 192 MMHG (ref 25–40)
PO2 TEMP ADJ BLDA: 109 MMHG (ref 64–87)
PO2 TEMP ADJ BLDA: 133 MMHG (ref 64–87)
PO2 TEMP ADJ BLDA: 133 MMHG (ref 64–87)
PO2 TEMP ADJ BLDA: 97 MMHG (ref 64–87)
PO2 TEMP ADJ BLDA: 99 MMHG (ref 64–87)
PO2 TEMP ADJ BLDV: 190 MMHG (ref 25–40)
POTASSIUM SERPL-SCNC: 3.9 MMOL/L (ref 3.6–5.5)
POTASSIUM SERPL-SCNC: 4.3 MMOL/L (ref 3.6–5.5)
PROPOXYPH UR QL SCN: NEGATIVE
PROT SERPL-MCNC: 7.8 G/DL (ref 6–8.2)
PROT UR QL STRIP: 30 MG/DL
PROTHROMBIN TIME: 14.2 SEC (ref 12–14.6)
RBC # BLD AUTO: 3.66 M/UL (ref 4.2–5.4)
RBC # URNS HPF: ABNORMAL /HPF
RBC UR QL AUTO: NEGATIVE
RSV RNA SPEC QL NAA+PROBE: NEGATIVE
SAO2 % BLDA: 95 % (ref 93–99)
SAO2 % BLDA: 97 % (ref 93–99)
SAO2 % BLDA: 98 % (ref 93–99)
SAO2 % BLDA: 98 % (ref 93–99)
SAO2 % BLDA: 99 % (ref 93–99)
SAO2 % BLDV: 99 %
SARS-COV-2 RNA RESP QL NAA+PROBE: NOTDETECTED
SODIUM SERPL-SCNC: 140 MMOL/L (ref 135–145)
SODIUM SERPL-SCNC: 141 MMOL/L (ref 135–145)
SP GR UR STRIP.AUTO: 1.02
SPECIMEN DRAWN FROM PATIENT: ABNORMAL
SPECIMEN SOURCE: NORMAL
TIDAL VOLUME IVT: 350 ML
TIDAL VOLUME IVT: 370 ML
TIDAL VOLUME IVT: 370 ML
TIDAL VOLUME IVT: 380 ML
TRIGL SERPL-MCNC: 41 MG/DL (ref 0–149)
UROBILINOGEN UR STRIP.AUTO-MCNC: 0.2 MG/DL
WBC # BLD AUTO: 11.4 K/UL (ref 4.8–10.8)
WBC #/AREA URNS HPF: ABNORMAL /HPF

## 2021-10-05 PROCEDURE — 36556 INSERT NON-TUNNEL CV CATH: CPT

## 2021-10-05 PROCEDURE — 700102 HCHG RX REV CODE 250 W/ 637 OVERRIDE(OP): Performed by: INTERNAL MEDICINE

## 2021-10-05 PROCEDURE — 36620 INSERTION CATHETER ARTERY: CPT | Performed by: NURSE PRACTITIONER

## 2021-10-05 PROCEDURE — 770022 HCHG ROOM/CARE - ICU (200)

## 2021-10-05 PROCEDURE — 36600 WITHDRAWAL OF ARTERIAL BLOOD: CPT

## 2021-10-05 PROCEDURE — 700101 HCHG RX REV CODE 250: Performed by: INTERNAL MEDICINE

## 2021-10-05 PROCEDURE — 94760 N-INVAS EAR/PLS OXIMETRY 1: CPT

## 2021-10-05 PROCEDURE — 02HV33Z INSERTION OF INFUSION DEVICE INTO SUPERIOR VENA CAVA, PERCUTANEOUS APPROACH: ICD-10-PCS | Performed by: INTERNAL MEDICINE

## 2021-10-05 PROCEDURE — 99291 CRITICAL CARE FIRST HOUR: CPT | Mod: 25 | Performed by: INTERNAL MEDICINE

## 2021-10-05 PROCEDURE — 84478 ASSAY OF TRIGLYCERIDES: CPT

## 2021-10-05 PROCEDURE — 0241U HCHG SARS-COV-2 COVID-19 NFCT DS RESP RNA 4 TRGT MIC: CPT

## 2021-10-05 PROCEDURE — 700101 HCHG RX REV CODE 250: Performed by: NURSE PRACTITIONER

## 2021-10-05 PROCEDURE — 31500 INSERT EMERGENCY AIRWAY: CPT | Performed by: INTERNAL MEDICINE

## 2021-10-05 PROCEDURE — 37799 UNLISTED PX VASCULAR SURGERY: CPT

## 2021-10-05 PROCEDURE — 700111 HCHG RX REV CODE 636 W/ 250 OVERRIDE (IP): Performed by: INTERNAL MEDICINE

## 2021-10-05 PROCEDURE — 71045 X-RAY EXAM CHEST 1 VIEW: CPT

## 2021-10-05 PROCEDURE — 80307 DRUG TEST PRSMV CHEM ANLYZR: CPT

## 2021-10-05 PROCEDURE — 99292 CRITICAL CARE ADDL 30 MIN: CPT | Mod: 25 | Performed by: INTERNAL MEDICINE

## 2021-10-05 PROCEDURE — 83735 ASSAY OF MAGNESIUM: CPT

## 2021-10-05 PROCEDURE — 81001 URINALYSIS AUTO W/SCOPE: CPT

## 2021-10-05 PROCEDURE — 94640 AIRWAY INHALATION TREATMENT: CPT

## 2021-10-05 PROCEDURE — 700105 HCHG RX REV CODE 258: Performed by: INTERNAL MEDICINE

## 2021-10-05 PROCEDURE — 85027 COMPLETE CBC AUTOMATED: CPT

## 2021-10-05 PROCEDURE — 83605 ASSAY OF LACTIC ACID: CPT | Mod: 91

## 2021-10-05 PROCEDURE — 84100 ASSAY OF PHOSPHORUS: CPT

## 2021-10-05 PROCEDURE — C9803 HOPD COVID-19 SPEC COLLECT: HCPCS | Performed by: INTERNAL MEDICINE

## 2021-10-05 PROCEDURE — 94799 UNLISTED PULMONARY SVC/PX: CPT

## 2021-10-05 PROCEDURE — 85610 PROTHROMBIN TIME: CPT

## 2021-10-05 PROCEDURE — 94003 VENT MGMT INPAT SUBQ DAY: CPT

## 2021-10-05 PROCEDURE — 36620 INSERTION CATHETER ARTERY: CPT

## 2021-10-05 PROCEDURE — 84145 PROCALCITONIN (PCT): CPT

## 2021-10-05 PROCEDURE — 36556 INSERT NON-TUNNEL CV CATH: CPT | Mod: RT | Performed by: INTERNAL MEDICINE

## 2021-10-05 PROCEDURE — 700111 HCHG RX REV CODE 636 W/ 250 OVERRIDE (IP)

## 2021-10-05 PROCEDURE — 0BH17EZ INSERTION OF ENDOTRACHEAL AIRWAY INTO TRACHEA, VIA NATURAL OR ARTIFICIAL OPENING: ICD-10-PCS | Performed by: NEUROLOGICAL SURGERY

## 2021-10-05 PROCEDURE — 5A1945Z RESPIRATORY VENTILATION, 24-96 CONSECUTIVE HOURS: ICD-10-PCS | Performed by: NEUROLOGICAL SURGERY

## 2021-10-05 PROCEDURE — 80053 COMPREHEN METABOLIC PANEL: CPT

## 2021-10-05 PROCEDURE — 03HY32Z INSERTION OF MONITORING DEVICE INTO UPPER ARTERY, PERCUTANEOUS APPROACH: ICD-10-PCS | Performed by: NEUROLOGICAL SURGERY

## 2021-10-05 PROCEDURE — 82962 GLUCOSE BLOOD TEST: CPT | Mod: 91

## 2021-10-05 PROCEDURE — 82803 BLOOD GASES ANY COMBINATION: CPT | Mod: 91

## 2021-10-05 PROCEDURE — 80048 BASIC METABOLIC PNL TOTAL CA: CPT

## 2021-10-05 RX ORDER — KETAMINE HYDROCHLORIDE 50 MG/ML
125 INJECTION, SOLUTION INTRAMUSCULAR; INTRAVENOUS ONCE
Status: COMPLETED | OUTPATIENT
Start: 2021-10-05 | End: 2021-10-05

## 2021-10-05 RX ORDER — HYDROMORPHONE HYDROCHLORIDE 1 MG/ML
1-2 INJECTION, SOLUTION INTRAMUSCULAR; INTRAVENOUS; SUBCUTANEOUS
Status: DISCONTINUED | OUTPATIENT
Start: 2021-10-05 | End: 2021-10-09

## 2021-10-05 RX ORDER — ROCURONIUM BROMIDE 10 MG/ML
100 INJECTION, SOLUTION INTRAVENOUS ONCE
Status: COMPLETED | OUTPATIENT
Start: 2021-10-05 | End: 2021-10-05

## 2021-10-05 RX ORDER — DEXTROSE MONOHYDRATE 25 G/50ML
50 INJECTION, SOLUTION INTRAVENOUS
Status: DISCONTINUED | OUTPATIENT
Start: 2021-10-05 | End: 2021-10-11 | Stop reason: HOSPADM

## 2021-10-05 RX ORDER — LABETALOL HYDROCHLORIDE 5 MG/ML
10 INJECTION, SOLUTION INTRAVENOUS
Status: DISCONTINUED | OUTPATIENT
Start: 2021-10-05 | End: 2021-10-11 | Stop reason: HOSPADM

## 2021-10-05 RX ORDER — NOREPINEPHRINE BITARTRATE 0.03 MG/ML
0-30 INJECTION, SOLUTION INTRAVENOUS CONTINUOUS
Status: DISCONTINUED | OUTPATIENT
Start: 2021-10-05 | End: 2021-10-07

## 2021-10-05 RX ORDER — MAGNESIUM SULFATE HEPTAHYDRATE 40 MG/ML
2 INJECTION, SOLUTION INTRAVENOUS ONCE
Status: COMPLETED | OUTPATIENT
Start: 2021-10-05 | End: 2021-10-05

## 2021-10-05 RX ADMIN — MINERAL OIL, PETROLATUM 1 APPLICATION: 425; 573 OINTMENT OPHTHALMIC at 14:24

## 2021-10-05 RX ADMIN — PROPOFOL 45 MCG/KG/MIN: 10 INJECTION, EMULSION INTRAVENOUS at 07:59

## 2021-10-05 RX ADMIN — INSULIN HUMAN 2 UNITS: 100 INJECTION, SOLUTION PARENTERAL at 17:36

## 2021-10-05 RX ADMIN — ROCURONIUM BROMIDE 8 MCG/KG/MIN: 10 INJECTION, SOLUTION INTRAVENOUS at 14:21

## 2021-10-05 RX ADMIN — ALBUTEROL SULFATE 20 MG/HR: 5 SOLUTION RESPIRATORY (INHALATION) at 18:32

## 2021-10-05 RX ADMIN — ROCURONIUM BROMIDE 10 MCG/KG/MIN: 10 INJECTION, SOLUTION INTRAVENOUS at 22:35

## 2021-10-05 RX ADMIN — SODIUM CHLORIDE 2 MG/KG/HR: 9 INJECTION, SOLUTION INTRAVENOUS at 08:01

## 2021-10-05 RX ADMIN — SODIUM CHLORIDE 4 MG/KG/HR: 9 INJECTION, SOLUTION INTRAVENOUS at 12:14

## 2021-10-05 RX ADMIN — FAMOTIDINE 20 MG: 10 INJECTION INTRAVENOUS at 17:50

## 2021-10-05 RX ADMIN — SODIUM CHLORIDE 2 MG/KG/HR: 9 INJECTION, SOLUTION INTRAVENOUS at 03:58

## 2021-10-05 RX ADMIN — Medication 3 MG/HR: at 03:17

## 2021-10-05 RX ADMIN — ROCURONIUM BROMIDE 12 MCG/KG/MIN: 10 INJECTION, SOLUTION INTRAVENOUS at 08:01

## 2021-10-05 RX ADMIN — SODIUM CHLORIDE 4 MG/KG/HR: 9 INJECTION, SOLUTION INTRAVENOUS at 22:06

## 2021-10-05 RX ADMIN — SODIUM BICARBONATE 100 MEQ: 84 INJECTION, SOLUTION INTRAVENOUS at 03:27

## 2021-10-05 RX ADMIN — METHYLPREDNISOLONE SODIUM SUCCINATE 62.5 MG: 125 INJECTION, POWDER, FOR SOLUTION INTRAMUSCULAR; INTRAVENOUS at 11:01

## 2021-10-05 RX ADMIN — ROCURONIUM BROMIDE 4 MCG/KG/MIN: 10 INJECTION, SOLUTION INTRAVENOUS at 01:15

## 2021-10-05 RX ADMIN — Medication 10 MG: at 06:21

## 2021-10-05 RX ADMIN — Medication 10 MG: at 09:02

## 2021-10-05 RX ADMIN — ROCURONIUM BROMIDE 39.5 MG: 10 INJECTION, SOLUTION INTRAVENOUS at 01:23

## 2021-10-05 RX ADMIN — HYDROMORPHONE HYDROCHLORIDE 1 MG: 1 INJECTION, SOLUTION INTRAMUSCULAR; INTRAVENOUS; SUBCUTANEOUS at 23:12

## 2021-10-05 RX ADMIN — ROCURONIUM BROMIDE 39.5 MG: 10 INJECTION, SOLUTION INTRAVENOUS at 04:42

## 2021-10-05 RX ADMIN — ALBUTEROL SULFATE 2.5 MG: 2.5 SOLUTION RESPIRATORY (INHALATION) at 07:00

## 2021-10-05 RX ADMIN — PROPOFOL 45 MCG/KG/MIN: 10 INJECTION, EMULSION INTRAVENOUS at 16:31

## 2021-10-05 RX ADMIN — ROCURONIUM BROMIDE 39.5 MG: 10 INJECTION, SOLUTION INTRAVENOUS at 20:05

## 2021-10-05 RX ADMIN — METHYLPREDNISOLONE SODIUM SUCCINATE 62.5 MG: 125 INJECTION, POWDER, FOR SOLUTION INTRAMUSCULAR; INTRAVENOUS at 23:12

## 2021-10-05 RX ADMIN — ROCURONIUM BROMIDE 10 MCG/KG/MIN: 10 INJECTION, SOLUTION INTRAVENOUS at 20:06

## 2021-10-05 RX ADMIN — SODIUM CHLORIDE 4 MG/KG/HR: 9 INJECTION, SOLUTION INTRAVENOUS at 14:20

## 2021-10-05 RX ADMIN — ALBUTEROL SULFATE 2.5 MG: 2.5 SOLUTION RESPIRATORY (INHALATION) at 04:56

## 2021-10-05 RX ADMIN — NOREPINEPHRINE BITARTRATE 2 MCG/MIN: 1 INJECTION INTRAVENOUS at 05:09

## 2021-10-05 RX ADMIN — ROCURONIUM BROMIDE 39.5 MG: 10 INJECTION, SOLUTION INTRAVENOUS at 23:53

## 2021-10-05 RX ADMIN — METHYLPREDNISOLONE SODIUM SUCCINATE 62.5 MG: 125 INJECTION, POWDER, FOR SOLUTION INTRAMUSCULAR; INTRAVENOUS at 05:07

## 2021-10-05 RX ADMIN — ROCURONIUM BROMIDE 100 MG: 10 INJECTION, SOLUTION INTRAVENOUS at 04:00

## 2021-10-05 RX ADMIN — HYDROMORPHONE HYDROCHLORIDE 3 MG/HR: 2 INJECTION INTRAMUSCULAR; INTRAVENOUS; SUBCUTANEOUS at 16:06

## 2021-10-05 RX ADMIN — PROPOFOL 25 MCG/KG/MIN: 10 INJECTION, EMULSION INTRAVENOUS at 00:45

## 2021-10-05 RX ADMIN — FAMOTIDINE 20 MG: 10 INJECTION INTRAVENOUS at 05:07

## 2021-10-05 RX ADMIN — MAGNESIUM SULFATE HEPTAHYDRATE 2 G: 40 INJECTION, SOLUTION INTRAVENOUS at 11:01

## 2021-10-05 RX ADMIN — MINERAL OIL, PETROLATUM 1 APPLICATION: 425; 573 OINTMENT OPHTHALMIC at 21:22

## 2021-10-05 RX ADMIN — SODIUM CHLORIDE 5 MG/KG/HR: 9 INJECTION, SOLUTION INTRAVENOUS at 00:21

## 2021-10-05 RX ADMIN — ENOXAPARIN SODIUM 40 MG: 40 INJECTION SUBCUTANEOUS at 05:08

## 2021-10-05 RX ADMIN — INSULIN HUMAN 2 UNITS: 100 INJECTION, SOLUTION PARENTERAL at 13:19

## 2021-10-05 RX ADMIN — SODIUM CHLORIDE 4 MG/KG/HR: 9 INJECTION, SOLUTION INTRAVENOUS at 19:01

## 2021-10-05 RX ADMIN — Medication 10 MG: at 12:31

## 2021-10-05 RX ADMIN — KETAMINE HYDROCHLORIDE 125 MG: 50 INJECTION INTRAMUSCULAR; INTRAVENOUS at 04:00

## 2021-10-05 RX ADMIN — PROPOFOL 45 MCG/KG/MIN: 10 INJECTION, EMULSION INTRAVENOUS at 21:22

## 2021-10-05 RX ADMIN — METHYLPREDNISOLONE SODIUM SUCCINATE 62.5 MG: 125 INJECTION, POWDER, FOR SOLUTION INTRAMUSCULAR; INTRAVENOUS at 17:50

## 2021-10-05 RX ADMIN — ALBUTEROL SULFATE 10 MG/HR: 5 SOLUTION RESPIRATORY (INHALATION) at 08:34

## 2021-10-05 RX ADMIN — ROCURONIUM BROMIDE 39.5 MG: 10 INJECTION, SOLUTION INTRAVENOUS at 01:15

## 2021-10-05 RX ADMIN — MINERAL OIL, PETROLATUM 1 APPLICATION: 425; 573 OINTMENT OPHTHALMIC at 05:08

## 2021-10-05 RX ADMIN — Medication 100 MEQ: at 03:27

## 2021-10-05 RX ADMIN — ALBUTEROL SULFATE 20 MG/HR: 5 SOLUTION RESPIRATORY (INHALATION) at 13:24

## 2021-10-05 RX ADMIN — PROPOFOL 45 MCG/KG/MIN: 10 INJECTION, EMULSION INTRAVENOUS at 04:26

## 2021-10-05 NOTE — PROGRESS NOTES
Critical Care Progress Note    Date of admission  10/4/2021    Chief Complaint  26 y.o. female admitted 10/4/2021 with status asthmaticus    Hospital Course  10/4 admitted to ICU      Interval Problem Update  Reviewed last 24 hour events:  Intubated overnight for hypoxic resp failure  Paralyzed on rocuronium  Sedated with propofol and ketamine at 2mg/kg/hr  Was given continuous albuterol and heliox and magneisum last night  This am, patient's PIP remains in 50s. Evidently I was reported that pt continued to have high peak pressure at 50s throughout the night. Pt was on albuterol Q2H scheduled.   This am, I came to bedside with PIP in low 50s, Pplateau in 20s  Repeat CXR with no obvious mucus plugging or PTX.   Repeat ABG with 7.28/41  I resumed continuous albuterol and increase rate to 12mg/hr  Ketamine gtt increased to 4mg/kg/hr  Magnesium 2gr IV x1 was given this am.   Pt is on steroids 62.5 Q6H  On levophed gtt. Line was placed.     Review of Systems  Review of Systems   Reason unable to perform ROS: intubated, sedated, RASS -4, unable to perform.        Vital Signs for last 24 hours   Temp:  [37.4 °C (99.3 °F)-37.7 °C (99.8 °F)] 37.4 °C (99.3 °F)  Pulse:  [101-155] 101  Resp:  [22-77] 32  BP: ()/(31-84) 79/31  SpO2:  [89 %-98 %] 97 %    Hemodynamic parameters for last 24 hours       Respiratory Information for the last 24 hours  Vent Mode: APVCMV  Rate (breaths/min): 28  Vt Target (mL): 370  PEEP/CPAP: 8  MAP: 14  Control VTE (exp VT): 370    Physical Exam   Physical Exam  Vitals and nursing note reviewed.   Constitutional:       General: She is not in acute distress.     Appearance: She is ill-appearing and toxic-appearing. She is not diaphoretic.      Comments: Intubated, sedated   HENT:      Head: Normocephalic.      Mouth/Throat:      Mouth: Mucous membranes are dry.      Comments: ET tube in place  Cardiovascular:      Rate and Rhythm: Regular rhythm.      Pulses: Normal pulses.      Heart sounds:  Normal heart sounds. No murmur heard.     Pulmonary:      Effort: No respiratory distress.      Breath sounds: Wheezing present.      Comments: Wheezing all over, inspiratory and expiratory  Good air entry  + rhonchi  Abdominal:      General: There is no distension.      Palpations: Abdomen is soft.      Tenderness: There is no abdominal tenderness. There is no guarding.   Musculoskeletal:      Cervical back: Neck supple.      Right lower leg: No edema.      Left lower leg: No edema.   Skin:     Capillary Refill: Capillary refill takes 2 to 3 seconds.      Coloration: Skin is not jaundiced.      Findings: No bruising or rash.   Neurological:      Comments: Intubated, sedated         Medications  Current Facility-Administered Medications   Medication Dose Route Frequency Provider Last Rate Last Admin   • propofol (DIPRIVAN) injection  0-80 mcg/kg/min Intravenous Continuous Robby Ham M.D. 17.8 mL/hr at 10/05/21 0759 45 mcg/kg/min at 10/05/21 0759   • norepinephrine (Levophed) 8 mg in 250 mL NS infusion (premix)  0-30 mcg/min Intravenous Continuous PAUL Tejeda   Stopped at 10/05/21 0818   • albuterol (PROVENTIL) 100 mg in NS 60 mL for continuous nebulization  20 mg/hr Nebulization RT-Continuous Henrry Russell D.O. 6 mL/hr at 10/05/21 0834 10 mg/hr at 10/05/21 0834   • albuterol (PROVENTIL) 2.5 mg/0.5 mL solution nebulizer            • methylPREDNISolone sod succ (SOLU-MEDROL) 125 MG injection 62.5 mg  62.5 mg Intravenous Q6HRS Robby Ham M.D.   62.5 mg at 10/05/21 0507   • senna-docusate (PERICOLACE or SENOKOT S) 8.6-50 MG per tablet 2 Tablet  2 Tablet Oral BID Robby Ham M.D.        And   • polyethylene glycol/lytes (MIRALAX) PACKET 1 Packet  1 Packet Oral QDAY PRN Robby Ham M.D.        And   • magnesium hydroxide (MILK OF MAGNESIA) suspension 30 mL  30 mL Oral QDAY PRN Robby Ham M.D.        And   • bisacodyl (DULCOLAX) suppository 10 mg  10 mg Rectal QDAY PRN Robby AGUIRRE  SHARON Ham       • enoxaparin (LOVENOX) inj 40 mg  40 mg Subcutaneous DAILY Robby Ham M.D.   40 mg at 10/05/21 0508   • acetaminophen (Tylenol) tablet 650 mg  650 mg Oral Q6HRS PRN Robby Ham M.D.       • ondansetron (ZOFRAN) syringe/vial injection 4 mg  4 mg Intravenous Q4HRS PRN Robby Ham M.D.       • ondansetron (ZOFRAN ODT) dispertab 4 mg  4 mg Oral Q4HRS PRN Robby Ham M.D.       • promethazine (PHENERGAN) tablet 12.5-25 mg  12.5-25 mg Oral Q4HRS PRN Robby Ham M.D.       • promethazine (PHENERGAN) suppository 12.5-25 mg  12.5-25 mg Rectal Q4HRS PRN Robby Ham M.D.       • prochlorperazine (COMPAZINE) injection 5-10 mg  5-10 mg Intravenous Q4HRS PRN Robby Ham M.D.       • guaiFENesin dextromethorphan (ROBITUSSIN DM) 100-10 MG/5ML syrup 10 mL  10 mL Oral Q6HRS PRN Robby Ham M.D.       • albuterol inhaler 2 Puff  2 Puff Inhalation Q2HRS PRN Robby Ham M.D.   2 Puff at 10/04/21 2009   • CALCIUM CHLORIDE 10 % IV SOLN            • artificial tears (EYE LUBRICANT) ophth ointment 1 Application  1 Application Both Eyes Q8HRS Robby Ham M.D.   1 Application at 10/05/21 0508   • HYDROmorphone (DILAUDID) 0.2 mg/mL in 50mL NS (Continuous Infusion)   Intravenous Continuous Robby Ham M.D. 15 mL/hr at 10/05/21 0741 3 mg/hr at 10/05/21 0741   • rocuronium (ZEMURON) 250 mg in  mL Infusion  0-16 mcg/kg/min Intravenous Continuous Robby Ham M.D. 47.4 mL/hr at 10/05/21 0801 12 mcg/kg/min at 10/05/21 0801   • rocuronium (ZEMURON) injection 39.5 mg  0.6 mg/kg Intravenous Q2HRS PRN Robby Ham M.D.   39.5 mg at 10/05/21 0442   • ketamine 500 mg in  mL infusion (critical care only)  2 mg/kg/hr Intravenous Continuous Robby Ham M.D. 65.8 mL/hr at 10/05/21 0801 2 mg/kg/hr at 10/05/21 0801   • midazolam (Versed) 2 MG/2ML injection 2-4 mg  2-4 mg Intravenous Q4HRS PRN Robby Ham M.D.   4 mg at 10/04/21 3939   •  Respiratory Therapy Consult   Nebulization Continuous RT Robby Ham M.D.       • famotidine (PEPCID) tablet 20 mg  20 mg Enteral Tube Q12HRS Robby Ham M.D.        Or   • famotidine (PEPCID) injection 20 mg  20 mg Intravenous Q12HRS Robby Ham M.D.   20 mg at 10/05/21 0507   • MD Alert...ICU Electrolyte Replacement per Pharmacy   Other PHARMACY TO DOSE Robby Ham M.D.       • lidocaine (XYLOCAINE) 1 % injection 2 mL  2 mL Tracheal Tube Q30 MIN PRN Robby Ham M.D.           Fluids    Intake/Output Summary (Last 24 hours) at 10/5/2021 0859  Last data filed at 10/5/2021 0500  Gross per 24 hour   Intake --   Output 250 ml   Net -250 ml       Laboratory  Recent Labs     10/05/21  0007 10/05/21  0007 10/05/21  0232 10/05/21  0515 10/05/21  0744   ISTATAPH 7.118*   < > 7.093* 7.190* 7.263*   ISTATAPCO2 60.8*   < > 53.6* 45.1* 43.4*   ISTATAPO2 100*   < > 136* 142* 116*   ISTATATCO2 22   < > 18* 19* 21   DQMETLA4MZT 95   < > 98 99 98   ISTATARTHCO3 19.7   < > 16.4* 17.2 19.6   ISTATARTBE -10*   < > -13* -11* -7*   ISTATTEMP 98.3 F   < > 36.5 C 96.0 F 35.8 C   ISTATFIO2 60  --  50  --  50   ISTATSPEC Arterial   < > Arterial Arterial Arterial   ISTATAPHTC 7.120*   < > 7.099* 7.209* 7.280*   RBNBFRLE4OR 99*   < > 133* 133* 109*    < > = values in this interval not displayed.         Recent Labs     10/04/21  1600 10/05/21  0109   SODIUM 137 140   POTASSIUM 4.7 4.3   CHLORIDE 104 109   CO2 22 15*   BUN 12 10   CREATININE 0.73 0.73   MAGNESIUM  --  2.3   PHOSPHORUS  --  4.6*   CALCIUM 8.5 8.1*     Recent Labs     10/04/21  1600 10/05/21  0109   ALTSGPT 10 12   ASTSGOT 18 22   ALKPHOSPHAT 68 69   TBILIRUBIN <0.2 <0.2   GLUCOSE 163* 216*     Recent Labs     10/04/21  1600 10/05/21  0109 10/05/21  0356   WBC 12.0*  --  11.4*   NEUTSPOLYS 52.90  --   --    LYMPHOCYTES 34.30  --   --    MONOCYTES 8.00  --   --    EOSINOPHILS 4.00  --   --    BASOPHILS 0.30  --   --    ASTSGOT 18 22  --    ALTSGPT 10  12  --    ALKPHOSPHAT 68 69  --    TBILIRUBIN <0.2 <0.2  --      Recent Labs     10/04/21  1600 10/05/21  0356   RBC 4.18* 3.66*   HEMOGLOBIN 12.9 11.6*   HEMATOCRIT 39.4 34.8*   PLATELETCT 336 264       Imaging  X-Ray:  I have personally reviewed the images and compared with prior images.    Assessment/Plan  Acute respiratory failure with hypoxia and hypercapnia (HCC)  Assessment & Plan  Secondary to status asthmaticus  Unclear precipitating event  Will get flu/COVID swab (not done last night).   Pt did get one dose of COVID vaccine on 9/22/2021  Chest x-ray unremarkable  Intubation complicated by aspiration during RSI    Lung protective ventilation strategies 6cc/kg, paO2 >60, pH >7.25  Optimize oxygenation, ventilation, and acid base balance  ABCDEF Bundle   Ongoing management of bronchospasm and difficulty ventilating  Deep sedation and paralysis  Check coags, lactate, procalcitonin  UDS    Severe asthma with status asthmaticus  Assessment & Plan  Hypoxic at 40% upon arrival of EMS. CXR unremarkable  After multiple rounds of ketamine IVP, continuous albuterol, steroids, magnesium, the patient became hypercapnic, more hypoxic and required mechanical ventilation  Received continuous albuterol, heliox, and magnesium  Solumedrol 62.5 mg q6 hours  Despite above therapy, pt continued to fail, pt subsequently was intubated in ICU 10/4  Ketamine infusion at 2 mg/kg/hr, paralyzed with rocuronium    Plan:   Resume continuous albuterol, increase rate to 12mg/hr.   Increase ketamine to 4mg/kg/hr.   Continue paralytics  Pt has no/very minimal autoPEEP. Rate was decreased from 32 to 24 with frequent ABGs.   I constantly came to bedside for vent management.   SpO2 > 92%  Serial ABGs.        VTE:  Heparin  Ulcer: H2 Antagonist  Lines: Central Line  Ongoing indication addressed, Arterial Line  Ongoing indication addressed and [place] Catheter  Ongoing indication addressed    I have performed a physical exam and reviewed and  updated ROS and Plan today (10/5/2021). In review of yesterday's note (10/4/2021), there are no changes except as documented above.     Discussed patient condition and risk of morbidity and/or mortality with RN, RT, Pharmacy and Charge nurse / hot rounds  The patient remains critically ill.  Critical care time = 140 minutes in directly providing and coordinating critical care and extensive data review.  No time overlap and excludes procedures.

## 2021-10-05 NOTE — DISCHARGE PLANNING
Pt requesting her boyfriend be called.     Pt Boyfriend Polo- [phone]    TORIE able to contact Polo, he stated he was coming but is trying to find a ride.   He will be here soon.     TORIE will update RN.

## 2021-10-05 NOTE — PROCEDURES
"Arterial Line Insertion    Date/Time: 10/5/2021 2:27 AM  Performed by: ZORAN Tejeda.  Authorized by: PAUL Tejeda   Consent: The procedure was performed in an emergent situation.  Risks and benefits: risks, benefits and alternatives were discussed  Required items: required blood products, implants, devices, and special equipment available  Patient identity confirmed: arm band and anonymous protocol, patient vented/unresponsive  Time out: Immediately prior to procedure a \"time out\" was called to verify the correct patient, procedure, equipment, support staff and site/side marked as required.  Preparation: Patient was prepped and draped in the usual sterile fashion.  Indications: multiple ABGs, respiratory failure and hemodynamic monitoring  Location: left radial  Anesthesia: local infiltration    Anesthesia:  Local Anesthetic: lidocaine 1% without epinephrine    Sedation:  Patient sedated: yes  Sedation type: anxiolysis  Sedatives: ketamine and propofol (vecuronium)  Analgesia: dilaudid.    Jorge Luis's test normal: yes  Needle gauge: 20  Seldinger technique: Seldinger technique used  Number of attempts: 2  Post-procedure: line sutured and dressing applied  Post-procedure CMS: normal  Patient tolerance: patient tolerated the procedure well with no immediate complications              "

## 2021-10-05 NOTE — PROCEDURES
Intubation    Date/Time: 10/5/2021 12:39 AM  Performed by: Robby Ham M.D.  Authorized by: Robby Ham M.D.     Consent:     Consent obtained:  Emergent situation  Pre-procedure details:     Patient status:  Altered mental status    Mallampati score:  I    Pretreatment meds: ketamine.    Paralytics:  Rocuronium  Procedure details:     Preoxygenation:  Bag valve mask    Laryngoscope type:  GlideScope    Cormack-Lehane Classification:  Grade 1    Tube size (mm):  7.5    Tube type:  Cuffed    Number of attempts:  1    Tube visualized through cords: yes    Placement assessment:     ETT to lip:  25    Tube secured with:  ETT limon    Breath sounds:  Equal    Placement verification: chest rise, condensation, CXR verification, equal breath sounds, ETCO2 detector and tube exhalation      Chest x-ray findings: retracted 2 cm.  Post-procedure details:     Patient tolerance of procedure:  Tolerated well, no immediate complications  Comments:      Complicated with an aspiration event during induction

## 2021-10-05 NOTE — ASSESSMENT & PLAN NOTE
Hypoxic at 40% upon arrival of EMS. CXR unremarkable  After multiple rounds of ketamine IVP, continuous albuterol/heliox, steroids, magnesium, the patient became hypercapnic, more hypoxic and required mechanical ventilation and paralyzed  Extubated 10/8 to bipap  Now on low flow O2  Unknown what her home asthma regimen, if any, has been    Plan:   -q4h nebs  -methylpred 40 daily, can switch to prednisone when cleared by speech  -When able to participate with inhalers, need high dose ICS/LABA and anti-cholinergic.  Recommend a de-escalation strategy with her rather than an escalation strategy given the severity of her asthma exacerbation  -I added singulair  - once mental status has cleared, needs counseling that she can never ever use inhaled drugs

## 2021-10-05 NOTE — PROCEDURES
Central Line Insertion    Date/Time: 10/5/2021 9:24 AM  Performed by: Henrry Russell D.O.  Authorized by: Henrry Russell D.O.     Consent:     Consent obtained:  Emergent situation    Consent given by:  Patient  Pre-procedure details:     Hand hygiene: Hand hygiene performed prior to insertion      Sterile barrier technique: All elements of maximal sterile technique followed      Skin preparation:  2% chlorhexidine  Anesthesia:     Anesthesia method:  Local infiltration    Local anesthetic:  Lidocaine 1% w/o epi  Procedure details:     Location:  R internal jugular    Patient position:  Flat    Procedural supplies:  Triple lumen    Catheter size:  7 Fr    Landmarks identified: yes      Ultrasound guidance: yes      Sterile ultrasound techniques: Sterile gel and sterile probe covers were used      Number of attempts:  1    Successful placement: yes    Post-procedure details:     Post-procedure:  Dressing applied and line sutured    Assessment:  Blood return through all ports and free fluid flow (pending cxr)    Patient tolerance of procedure:  Tolerated well, no immediate complications

## 2021-10-05 NOTE — DISCHARGE PLANNING
Anticipated Discharge Disposition: Expect that pt will be able to return to home.    Action: Spoke to S/O. Pt currently intubated and sedated.    Barriers to Discharge: Not yet medically cleared.    Plan: Cont to follow for discharge planning needs.

## 2021-10-05 NOTE — DISCHARGE PLANNING
Care Transition Team Assessment  In the case of an emergency, pt's legal NOK is Unknown at this time. Has brotherASTER, last name unknown and sister Sofy, last name and contact info unknown.    RNCM called S/O Polo and obtained the information used in this assessment. Verified accuracy of facesheet. Pt lives in a single story apt with S/O.  Pt is not established with any pharmacy. Uses OTC asthma meds. Pt is independent with all ADLs prior to admit.Pt has a limited support system.Has brother and sister, both in [place]. Boyfriend states that siblings are aware that pt has been admitted and will come to see her.  Pt denies/admits any hx of substance use and denies any dx of mh. Does smoke marijuana regularly.    Information Source  Orientation Level: Unable to assess  Information Given By: Friend  Informant's Name:  (Polo Rhodes)  Who is responsible for making decisions for patient? : Patient         Elopement Risk  Legal Hold: No  Ambulatory or Self Mobile in Wheelchair: No-Not an Elopement Risk  Elopement Risk: Not at Risk for Elopement    Interdisciplinary Discharge Planning  Primary Care Physician:  (none)  Lives with - Patient's Self Care Capacity: Significant Other  Patient or legal guardian wants to designate a caregiver: No  Support Systems: Family Member(s)  Housing / Facility: 1 Story Apartment / Condo  Do You Take your Prescribed Medications Regularly: Yes  Mobility Issues: No  Durable Medical Equipment: Not Applicable    Discharge Preparedness  What is your plan after discharge?: Uncertain - pending medical team collaboration  What are your discharge supports?: Partner, Sibling  Prior Functional Level: Ambulatory, Drives Self, Independent with Activities of Daily Living, Independent with Medication Management  Difficulity with ADLs: None  Difficulity with IADLs: None    Functional Assesment  Prior Functional Level: Ambulatory, Drives Self, Independent with Activities of Daily Living, Independent with  Medication Management    Finances  Prescription Coverage: Yes    Vision / Hearing Impairment  Vision Impairment : No  Hearing Impairment : No         Advance Directive  Advance Directive?: Clinically incapacitated / Inappropriate    Domestic Abuse  Have you ever been the victim of abuse or violence?: No  Physical Abuse or Sexual Abuse: No  Verbal Abuse or Emotional Abuse: No  Possible Abuse/Neglect Reported to:: Not Applicable    Psychological Assessment  History of Substance Abuse: None  History of Psychiatric Problems: No    Discharge Risks or Barriers  Discharge risks or barriers?: No PCP  Patient risk factors: No PCP    Anticipated Discharge Information  Discharge Disposition: Discharged to home/self care (01)

## 2021-10-05 NOTE — ED NOTES
RT and MD at bedside  Patient is now on heliox   Respirations are less labored, now speaking full sentences

## 2021-10-05 NOTE — CONSULTS
Critical Care Consultation    Date of consult: 10/4/2021    Referring Physician  Anna Humphrey    Reason for Consultation  Status asthmaticus     History of Presenting Illness  26 y.o. female with history of asthma who presented 10/4/2021 with status asthmaticus.  Reportedly paramedics arrived and her SPO2 was 40% and she was mottled and toxic appearing, upon arrival to the emergency department she was given continuous albuterol, magnesium, epi, BiPAP and then transition to high flow with an heliox.  She cannot provide any history secondary to severe respiratory distress.    Unclear precipitating event.    She was admitted to the ICU where she received multiple rounds of IV push ketamine which had good effect on her bronchospasm and anxiety.  However despite magnesium, continuous albuterol, ketamine, heliox, BiPAP, steroids she entered further extremis requiring intubation mechanical ventilation in the setting of hypoxic and hypercapnic respiratory failure.    Code Status  Full Code    Review of Systems  Review of Systems   Unable to perform ROS: Severe respiratory distress       Past Medical History   has no past medical history on file.    Surgical History   has no past surgical history on file.    Family History  family history is not on file.    Social History   reports that she has never smoked. She has never used smokeless tobacco. She reports that she does not drink alcohol and does not use drugs.    Medications  Home Medications     Reviewed by Luly Carvalho (Pharmacy Tech) on 10/04/21 at 1759  Med List Status: Complete   Medication Last Dose Status   ALBUTEROL SULFATE PO 10/4/2021 Active   pseudoephedrine-guaifenesin (MUCINEX D)  MG per tablet 10/3/2021 Active              Current Facility-Administered Medications   Medication Dose Route Frequency Provider Last Rate Last Admin   • propofol (DIPRIVAN) injection  0-80 mcg/kg/min Intravenous Continuous Robby Ham M.D. 9.9 mL/hr at 10/05/21  0045 25 mcg/kg/min at 10/05/21 0045   • albuterol (PROVENTIL) 2.5mg/0.5ml nebulizer solution 2.5 mg  2.5 mg Nebulization Q2HRS (RT) Robby Ham M.D.       • methylPREDNISolone sod succ (SOLU-MEDROL) 125 MG injection 62.5 mg  62.5 mg Intravenous Q6HRS Robby Ham M.D.   62.5 mg at 10/04/21 2355   • senna-docusate (PERICOLACE or SENOKOT S) 8.6-50 MG per tablet 2 Tablet  2 Tablet Oral BID Robby Ham M.D.        And   • polyethylene glycol/lytes (MIRALAX) PACKET 1 Packet  1 Packet Oral QDAY PRN Robby Ham M.D.        And   • magnesium hydroxide (MILK OF MAGNESIA) suspension 30 mL  30 mL Oral QDAY PRN Robby Ham M.D.        And   • bisacodyl (DULCOLAX) suppository 10 mg  10 mg Rectal QDAY PRN Robby Ham M.D.       • enoxaparin (LOVENOX) inj 40 mg  40 mg Subcutaneous DAILY Robby Ham M.D.       • acetaminophen (Tylenol) tablet 650 mg  650 mg Oral Q6HRS PRN Robby Ham M.D.       • ondansetron (ZOFRAN) syringe/vial injection 4 mg  4 mg Intravenous Q4HRS PRN Robby Ham M.D.       • ondansetron (ZOFRAN ODT) dispertab 4 mg  4 mg Oral Q4HRS PRN Robby Ham M.D.       • promethazine (PHENERGAN) tablet 12.5-25 mg  12.5-25 mg Oral Q4HRS PRN Robby Ham M.D.       • promethazine (PHENERGAN) suppository 12.5-25 mg  12.5-25 mg Rectal Q4HRS PRN Robby Ham M.D.       • prochlorperazine (COMPAZINE) injection 5-10 mg  5-10 mg Intravenous Q4HRS PRN Robby Ham M.D.       • guaiFENesin dextromethorphan (ROBITUSSIN DM) 100-10 MG/5ML syrup 10 mL  10 mL Oral Q6HRS PRN Robby Ham M.D.       • albuterol inhaler 2 Puff  2 Puff Inhalation Q2HRS PRN Robby Ham M.D.   2 Puff at 10/04/21 2009   • CALCIUM CHLORIDE 10 % IV SOLN            • SODIUM BICARBONATE 8.4 % IV SOLN            • artificial tears (EYE LUBRICANT) ophth ointment 1 Application  1 Application Both Eyes Q8HRS Robby Ham M.D.   1 Application at 10/04/21 6654   • HYDROmorphone  (DILAUDID) 0.2 mg/mL in 50mL NS (Continuous Infusion)   Intravenous Continuous Robby Ham M.D. 10 mL/hr at 10/04/21 2356 2 mg/hr at 10/04/21 2356   • rocuronium (ZEMURON) injection 39.5 mg  0.6 mg/kg Intravenous Once Robby Ham M.D.       • rocuronium (ZEMURON) 250 mg in  mL Infusion  0-16 mcg/kg/min Intravenous Continuous Robby Ham M.D.       • rocuronium (ZEMURON) injection 39.5 mg  0.6 mg/kg Intravenous Q2HRS PRN Robby Ham M.D.       • ketamine 500 mg in  mL infusion (critical care only)  2 mg/kg/hr Intravenous Continuous Robby Ham M.D. 65.8 mL/hr at 10/05/21 0042 2 mg/kg/hr at 10/05/21 0042   • midazolam (Versed) 2 MG/2ML injection 2-4 mg  2-4 mg Intravenous Q4HRS PRN Robby Ham M.D.   4 mg at 10/04/21 2354   • Respiratory Therapy Consult   Nebulization Continuous RT Robby Ham M.D.       • famotidine (PEPCID) tablet 20 mg  20 mg Enteral Tube Q12HRS Robby Ham M.D.        Or   • famotidine (PEPCID) injection 20 mg  20 mg Intravenous Q12HRS Robby Ham M.D.   20 mg at 10/04/21 2355   • MD Alert...ICU Electrolyte Replacement per Pharmacy   Other PHARMACY TO DOSE Robby Ham M.D.       • lidocaine (XYLOCAINE) 1 % injection 2 mL  2 mL Tracheal Tube Q30 MIN PRN Robby Ham M.D.           Allergies  No Known Allergies    Vital Signs last 24 hours  Temp:  [37.4 °C (99.3 °F)-37.7 °C (99.8 °F)] 37.4 °C (99.3 °F)  Pulse:  [120-155] 120  Resp:  [22-77] 28  BP: ()/(55-84) 121/64  SpO2:  [89 %-98 %] 93 %    Physical Exam  Physical Exam  Vitals and nursing note reviewed.   Constitutional:       Appearance: She is toxic-appearing.   HENT:      Head: Normocephalic and atraumatic.      Right Ear: External ear normal.      Left Ear: External ear normal.      Nose: Nose normal.      Mouth/Throat:      Mouth: Mucous membranes are moist.   Eyes:      Pupils: Pupils are equal, round, and reactive to light.   Cardiovascular:      Rate and  Rhythm: Regular rhythm. Tachycardia present.      Pulses: Normal pulses.      Heart sounds: No murmur heard.   No gallop.    Pulmonary:      Comments: Tripoding, accessory muscle use, decreased air movement with expiratory wheeze  Abdominal:      General: Abdomen is flat. There is no distension.      Palpations: Abdomen is soft.      Tenderness: There is no abdominal tenderness. There is no guarding or rebound.   Musculoskeletal:      Cervical back: Normal range of motion. No rigidity.      Right lower leg: No edema.      Left lower leg: No edema.   Lymphadenopathy:      Cervical: No cervical adenopathy.   Skin:     General: Skin is warm and dry.      Capillary Refill: Capillary refill takes 2 to 3 seconds.   Neurological:      Mental Status: She is alert.      Comments: Awake, alert, oriented, able to move all 4 extremities normally         Fluids  No intake or output data in the 24 hours ending 10/05/21 0049    Laboratory  Recent Results (from the past 48 hour(s))   CBC WITH DIFFERENTIAL    Collection Time: 10/04/21  4:00 PM   Result Value Ref Range    WBC 12.0 (H) 4.8 - 10.8 K/uL    RBC 4.18 (L) 4.20 - 5.40 M/uL    Hemoglobin 12.9 12.0 - 16.0 g/dL    Hematocrit 39.4 37.0 - 47.0 %    MCV 94.3 81.4 - 97.8 fL    MCH 30.9 27.0 - 33.0 pg    MCHC 32.7 (L) 33.6 - 35.0 g/dL    RDW 43.3 35.9 - 50.0 fL    Platelet Count 336 164 - 446 K/uL    MPV 9.1 9.0 - 12.9 fL    Neutrophils-Polys 52.90 44.00 - 72.00 %    Lymphocytes 34.30 22.00 - 41.00 %    Monocytes 8.00 0.00 - 13.40 %    Eosinophils 4.00 0.00 - 6.90 %    Basophils 0.30 0.00 - 1.80 %    Immature Granulocytes 0.50 0.00 - 0.90 %    Nucleated RBC 0.00 /100 WBC    Neutrophils (Absolute) 6.32 2.00 - 7.15 K/uL    Lymphs (Absolute) 4.11 1.00 - 4.80 K/uL    Monos (Absolute) 0.96 (H) 0.00 - 0.85 K/uL    Eos (Absolute) 0.48 0.00 - 0.51 K/uL    Baso (Absolute) 0.04 0.00 - 0.12 K/uL    Immature Granulocytes (abs) 0.06 0.00 - 0.11 K/uL    NRBC (Absolute) 0.00 K/uL   CMP     Collection Time: 10/04/21  4:00 PM   Result Value Ref Range    Sodium 137 135 - 145 mmol/L    Potassium 4.7 3.6 - 5.5 mmol/L    Chloride 104 96 - 112 mmol/L    Co2 22 20 - 33 mmol/L    Anion Gap 11.0 7.0 - 16.0    Glucose 163 (H) 65 - 99 mg/dL    Bun 12 8 - 22 mg/dL    Creatinine 0.73 0.50 - 1.40 mg/dL    Calcium 8.5 8.5 - 10.5 mg/dL    AST(SGOT) 18 12 - 45 U/L    ALT(SGPT) 10 2 - 50 U/L    Alkaline Phosphatase 68 30 - 99 U/L    Total Bilirubin <0.2 0.1 - 1.5 mg/dL    Albumin 3.6 3.2 - 4.9 g/dL    Total Protein 7.4 6.0 - 8.2 g/dL    Globulin 3.8 (H) 1.9 - 3.5 g/dL    A-G Ratio 0.9 g/dL   HCG QUAL SERUM    Collection Time: 10/04/21  4:00 PM   Result Value Ref Range    Beta-Hcg Qualitative Serum Negative Negative   ESTIMATED GFR    Collection Time: 10/04/21  4:00 PM   Result Value Ref Range    GFR If African American >60 >60 mL/min/1.73 m 2    GFR If Non African American >60 >60 mL/min/1.73 m 2   POCT venous blood gas device results    Collection Time: 10/04/21 10:38 PM   Result Value Ref Range    Ph 7.147 (L) 7.310 - 7.450    Pco2 56.2 (H) 41.0 - 51.0 mmHg    Po2 192 (H) 25 - 40 mmHg    Tco2 21 20 - 33 mmol/L    SO2 99 %    Hco3 19.4 (L) 24.0 - 28.0 mmol/L    BE -10 (L) -4 - 3 mmol/L    Body Temp 98.0 F degrees    O2 Therapy 100 %    iPF Ratio 192     Ph Temp Correc 7.151 (L) 7.310 - 7.450    Pco2 Temp Ozzie 55.4 (H) 41.0 - 51.0 mmHg    Po2 Temp Corre 190 (H) 25 - 40 mmHg    Specimen Venous     DelSys NIV    POCT arterial blood gas device results    Collection Time: 10/05/21 12:07 AM   Result Value Ref Range    Ph 7.118 (LL) 7.400 - 7.500    Pco2 60.8 (HH) 26.0 - 37.0 mmHg    Po2 100 (H) 64 - 87 mmHg    Tco2 22 20 - 33 mmol/L    S02 95 93 - 99 %    Hco3 19.7 17.0 - 25.0 mmol/L    BE -10 (L) -4 - 3 mmol/L    Body Temp 98.3 F degrees    O2 Therapy 60 %    iPF Ratio 167     Ph Temp Ozzie 7.120 (LL) 7.400 - 7.500    Pco2 Temp Co 60.4 (HH) 26.0 - 37.0 mmHg    Po2 Temp Cor 99 (H) 64 - 87 mmHg    Specimen Arterial     DelSys  Vent     End Tidal Carbon Dioxide 37 mmhg    Tidal Volume 350 mL    Peep End Expiratory Pressure 10 cmh20    Set Rate 28     Mode APV-CMV    POCT lactate device results    Collection Time: 10/05/21 12:07 AM   Result Value Ref Range    iStat Lactate 3.6 (H) 0.5 - 2.0 mmol/L       Imaging  DX-CHEST-PORTABLE (1 VIEW)   Final Result         1.  No acute cardiopulmonary disease.   2.  Endotracheal tube terminates near the kenny, recommend withdrawal 2 cm.      DX-CHEST-LIMITED (1 VIEW)   Final Result      No acute cardiopulmonary disease.          Assessment/Plan  Acute respiratory failure with hypoxia and hypercapnia (HCC)  Assessment & Plan  Secondary to status asthmaticus  Unclear precipitating event, respiratory panel pending  Chest x-ray unremarkable  Intubation complicated by aspiration during RSI    Lung protective ventilation strategies  Optimize oxygenation, ventilation, and acid base balance  ABCDEF Bundle   Ongoing management of bronchospasm and difficulty ventilating  Deep sedation and paralysis    Severe asthma with status asthmaticus  Assessment & Plan  Hypoxic at 40% upon arrival of EMS in extremis  CXR unremarkable  After multiple rounds of ketamine IVP, continuous albuterol, steroids, magnesium, the patient became hypercapnic, more hypoxic and required mechanical ventilation    Albuterol continuous at 7-10, monitor closely for toxicity  Solumedrol 62.5 mg q6 hours  Magnesium 2 g x 1 (second bolus) over 20 minutes  Heliox via HFNC  Ketamine infusion at 2 mg/kg/hr  SpO2 > 92%  Respiratory panel ordered      Discussed patient condition and risk of morbidity and/or mortality with RN, RT, Therapies, Pharmacy, Dietary, Charge nurse / hot rounds and Patient.    The patient remains critically ill.  Critical care time = 155 minutes in directly providing and coordinating critical care and extensive data review.  No time overlap and excludes procedures.

## 2021-10-05 NOTE — HOSPITAL COURSE
10/4 admitted to ICU  10/4 heliox via HFNC, continuous albuterol, magnesium  10/4 intubated for acute hypoxic resp failure, ketamine gtt, propofol gtt    10/7: Not waking up from sedation, CTH OK. High level autoPEEP resolved with vent adjustment  10/8: extubated to bipap. Agitated delirium on precedex

## 2021-10-05 NOTE — PROGRESS NOTES
Patient to T633 with RT and Dr. Ham. Patient placed back on HFNC with albuterol and billy-ox. Orders verified with MD.

## 2021-10-05 NOTE — PROGRESS NOTES
Worsening respiratory distress. Jitendra at bedside. Decision to intubate.     Timeout @ 2250  125 ketamine @ 2252  100 Reinier @ 2252  7.5 ET inserted with color change @ 2255  Secured @ 23 @ lip  Ketamine gtt started @ 2256

## 2021-10-06 ENCOUNTER — APPOINTMENT (OUTPATIENT)
Dept: RADIOLOGY | Facility: MEDICAL CENTER | Age: 26
DRG: 208 | End: 2021-10-06
Attending: INTERNAL MEDICINE
Payer: MEDICAID

## 2021-10-06 LAB
ALBUMIN SERPL BCP-MCNC: 3.3 G/DL (ref 3.2–4.9)
ALBUMIN/GLOB SERPL: 1.1 G/DL
ALP SERPL-CCNC: 57 U/L (ref 30–99)
ALT SERPL-CCNC: 13 U/L (ref 2–50)
ANION GAP SERPL CALC-SCNC: 9 MMOL/L (ref 7–16)
AST SERPL-CCNC: 20 U/L (ref 12–45)
BASE EXCESS BLDA CALC-SCNC: -5 MMOL/L (ref -4–3)
BASE EXCESS BLDA CALC-SCNC: -5 MMOL/L (ref -4–3)
BILIRUB SERPL-MCNC: <0.2 MG/DL (ref 0.1–1.5)
BODY TEMPERATURE: ABNORMAL DEGREES
BODY TEMPERATURE: ABNORMAL DEGREES
BREATHS SETTING VENT: 24
BREATHS SETTING VENT: 34
BUN SERPL-MCNC: 15 MG/DL (ref 8–22)
CALCIUM SERPL-MCNC: 7.5 MG/DL (ref 8.5–10.5)
CHLORIDE SERPL-SCNC: 117 MMOL/L (ref 96–112)
CO2 BLDA-SCNC: 24 MMOL/L (ref 20–33)
CO2 BLDA-SCNC: 24 MMOL/L (ref 20–33)
CO2 SERPL-SCNC: 20 MMOL/L (ref 20–33)
CREAT SERPL-MCNC: 0.76 MG/DL (ref 0.5–1.4)
DELSYS IDSYS: ABNORMAL
DELSYS IDSYS: ABNORMAL
END TIDAL CARBON DIOXIDE IECO2: 31 MMHG
END TIDAL CARBON DIOXIDE IECO2: 32 MMHG
ERYTHROCYTE [DISTWIDTH] IN BLOOD BY AUTOMATED COUNT: 46.2 FL (ref 35.9–50)
GLOBULIN SER CALC-MCNC: 3.1 G/DL (ref 1.9–3.5)
GLUCOSE BLD-MCNC: 135 MG/DL (ref 65–99)
GLUCOSE BLD-MCNC: 144 MG/DL (ref 65–99)
GLUCOSE SERPL-MCNC: 137 MG/DL (ref 65–99)
HCO3 BLDA-SCNC: 22.3 MMOL/L (ref 17–25)
HCO3 BLDA-SCNC: 22.8 MMOL/L (ref 17–25)
HCT VFR BLD AUTO: 33.4 % (ref 37–47)
HGB BLD-MCNC: 10.8 G/DL (ref 12–16)
HOROWITZ INDEX BLDA+IHG-RTO: 165 MM[HG]
HOROWITZ INDEX BLDA+IHG-RTO: 260 MM[HG]
LACTATE BLD-SCNC: 1.1 MMOL/L (ref 0.5–2)
MAGNESIUM SERPL-MCNC: 2.8 MG/DL (ref 1.5–2.5)
MCH RBC QN AUTO: 31.1 PG (ref 27–33)
MCHC RBC AUTO-ENTMCNC: 32.3 G/DL (ref 33.6–35)
MCV RBC AUTO: 96.3 FL (ref 81.4–97.8)
MODE IMODE: ABNORMAL
MODE IMODE: ABNORMAL
O2/TOTAL GAS SETTING VFR VENT: 40 %
O2/TOTAL GAS SETTING VFR VENT: 40 %
PCO2 BLDA: 48.8 MMHG (ref 26–37)
PCO2 BLDA: 53.9 MMHG (ref 26–37)
PCO2 TEMP ADJ BLDA: 47.3 MMHG (ref 26–37)
PCO2 TEMP ADJ BLDA: 53.9 MMHG (ref 26–37)
PEEP END EXPIRATORY PRESSURE IPEEP: 8 CMH20
PEEP END EXPIRATORY PRESSURE IPEEP: 8 CMH20
PH BLDA: 7.24 [PH] (ref 7.4–7.5)
PH BLDA: 7.27 [PH] (ref 7.4–7.5)
PH TEMP ADJ BLDA: 7.24 [PH] (ref 7.4–7.5)
PH TEMP ADJ BLDA: 7.28 [PH] (ref 7.4–7.5)
PHOSPHATE SERPL-MCNC: 3.1 MG/DL (ref 2.5–4.5)
PLATELET # BLD AUTO: 288 K/UL (ref 164–446)
PMV BLD AUTO: 9.3 FL (ref 9–12.9)
PO2 BLDA: 104 MMHG (ref 64–87)
PO2 BLDA: 66 MMHG (ref 64–87)
PO2 TEMP ADJ BLDA: 104 MMHG (ref 64–87)
PO2 TEMP ADJ BLDA: 63 MMHG (ref 64–87)
POTASSIUM SERPL-SCNC: 4.3 MMOL/L (ref 3.6–5.5)
PROCALCITONIN SERPL-MCNC: 0.97 NG/ML
PROCALCITONIN SERPL-MCNC: 1.21 NG/ML
PROT SERPL-MCNC: 6.4 G/DL (ref 6–8.2)
RBC # BLD AUTO: 3.47 M/UL (ref 4.2–5.4)
SAO2 % BLDA: 90 % (ref 93–99)
SAO2 % BLDA: 97 % (ref 93–99)
SODIUM SERPL-SCNC: 146 MMOL/L (ref 135–145)
SPECIMEN DRAWN FROM PATIENT: ABNORMAL
SPECIMEN DRAWN FROM PATIENT: ABNORMAL
TIDAL VOLUME IVT: 370 ML
TIDAL VOLUME IVT: 370 ML
WBC # BLD AUTO: 30.7 K/UL (ref 4.8–10.8)

## 2021-10-06 PROCEDURE — 94640 AIRWAY INHALATION TREATMENT: CPT

## 2021-10-06 PROCEDURE — 700111 HCHG RX REV CODE 636 W/ 250 OVERRIDE (IP): Performed by: INTERNAL MEDICINE

## 2021-10-06 PROCEDURE — 84100 ASSAY OF PHOSPHORUS: CPT

## 2021-10-06 PROCEDURE — 770022 HCHG ROOM/CARE - ICU (200)

## 2021-10-06 PROCEDURE — 700101 HCHG RX REV CODE 250: Performed by: INTERNAL MEDICINE

## 2021-10-06 PROCEDURE — 37799 UNLISTED PX VASCULAR SURGERY: CPT

## 2021-10-06 PROCEDURE — 700111 HCHG RX REV CODE 636 W/ 250 OVERRIDE (IP): Performed by: NURSE PRACTITIONER

## 2021-10-06 PROCEDURE — 87040 BLOOD CULTURE FOR BACTERIA: CPT

## 2021-10-06 PROCEDURE — 83605 ASSAY OF LACTIC ACID: CPT

## 2021-10-06 PROCEDURE — 85027 COMPLETE CBC AUTOMATED: CPT

## 2021-10-06 PROCEDURE — 99291 CRITICAL CARE FIRST HOUR: CPT | Performed by: INTERNAL MEDICINE

## 2021-10-06 PROCEDURE — 94799 UNLISTED PULMONARY SVC/PX: CPT

## 2021-10-06 PROCEDURE — 84145 PROCALCITONIN (PCT): CPT

## 2021-10-06 PROCEDURE — 36600 WITHDRAWAL OF ARTERIAL BLOOD: CPT

## 2021-10-06 PROCEDURE — 82962 GLUCOSE BLOOD TEST: CPT

## 2021-10-06 PROCEDURE — 94003 VENT MGMT INPAT SUBQ DAY: CPT

## 2021-10-06 PROCEDURE — 71045 X-RAY EXAM CHEST 1 VIEW: CPT

## 2021-10-06 PROCEDURE — 700105 HCHG RX REV CODE 258: Performed by: INTERNAL MEDICINE

## 2021-10-06 PROCEDURE — 80053 COMPREHEN METABOLIC PANEL: CPT

## 2021-10-06 PROCEDURE — 82803 BLOOD GASES ANY COMBINATION: CPT

## 2021-10-06 PROCEDURE — 700102 HCHG RX REV CODE 250 W/ 637 OVERRIDE(OP): Performed by: INTERNAL MEDICINE

## 2021-10-06 PROCEDURE — A9270 NON-COVERED ITEM OR SERVICE: HCPCS | Performed by: INTERNAL MEDICINE

## 2021-10-06 PROCEDURE — 83735 ASSAY OF MAGNESIUM: CPT

## 2021-10-06 RX ORDER — MIDAZOLAM HYDROCHLORIDE 1 MG/ML
3 INJECTION INTRAMUSCULAR; INTRAVENOUS
Status: DISCONTINUED | OUTPATIENT
Start: 2021-10-06 | End: 2021-10-06

## 2021-10-06 RX ADMIN — PROPOFOL 65 MCG/KG/MIN: 10 INJECTION, EMULSION INTRAVENOUS at 18:52

## 2021-10-06 RX ADMIN — ALBUTEROL SULFATE 20 MG/HR: 5 SOLUTION RESPIRATORY (INHALATION) at 05:19

## 2021-10-06 RX ADMIN — PROPOFOL 65 MCG/KG/MIN: 10 INJECTION, EMULSION INTRAVENOUS at 21:50

## 2021-10-06 RX ADMIN — ENOXAPARIN SODIUM 40 MG: 40 INJECTION SUBCUTANEOUS at 05:33

## 2021-10-06 RX ADMIN — SODIUM CHLORIDE 4 MG/KG/HR: 9 INJECTION, SOLUTION INTRAVENOUS at 17:37

## 2021-10-06 RX ADMIN — HYDROMORPHONE HYDROCHLORIDE 1 MG: 1 INJECTION, SOLUTION INTRAMUSCULAR; INTRAVENOUS; SUBCUTANEOUS at 02:04

## 2021-10-06 RX ADMIN — SODIUM CHLORIDE 4 MG/KG/HR: 9 INJECTION, SOLUTION INTRAVENOUS at 02:14

## 2021-10-06 RX ADMIN — HYDROMORPHONE HYDROCHLORIDE 2 MG: 1 INJECTION, SOLUTION INTRAMUSCULAR; INTRAVENOUS; SUBCUTANEOUS at 06:04

## 2021-10-06 RX ADMIN — METHYLPREDNISOLONE SODIUM SUCCINATE 62.5 MG: 125 INJECTION, POWDER, FOR SOLUTION INTRAMUSCULAR; INTRAVENOUS at 23:21

## 2021-10-06 RX ADMIN — SODIUM CHLORIDE 4 MG/KG/HR: 9 INJECTION, SOLUTION INTRAVENOUS at 13:27

## 2021-10-06 RX ADMIN — MINERAL OIL, PETROLATUM 1 APPLICATION: 425; 573 OINTMENT OPHTHALMIC at 05:34

## 2021-10-06 RX ADMIN — FAMOTIDINE 20 MG: 20 TABLET ORAL at 05:32

## 2021-10-06 RX ADMIN — FAMOTIDINE 20 MG: 10 INJECTION INTRAVENOUS at 18:11

## 2021-10-06 RX ADMIN — METHYLPREDNISOLONE SODIUM SUCCINATE 62.5 MG: 125 INJECTION, POWDER, FOR SOLUTION INTRAMUSCULAR; INTRAVENOUS at 18:08

## 2021-10-06 RX ADMIN — METHYLPREDNISOLONE SODIUM SUCCINATE 62.5 MG: 125 INJECTION, POWDER, FOR SOLUTION INTRAMUSCULAR; INTRAVENOUS at 05:33

## 2021-10-06 RX ADMIN — SODIUM CHLORIDE 4 MG/KG/HR: 9 INJECTION, SOLUTION INTRAVENOUS at 21:50

## 2021-10-06 RX ADMIN — METHYLPREDNISOLONE SODIUM SUCCINATE 62.5 MG: 125 INJECTION, POWDER, FOR SOLUTION INTRAMUSCULAR; INTRAVENOUS at 12:12

## 2021-10-06 RX ADMIN — ROCURONIUM BROMIDE 39.5 MG: 10 INJECTION, SOLUTION INTRAVENOUS at 04:03

## 2021-10-06 RX ADMIN — ROCURONIUM BROMIDE 39.5 MG: 10 INJECTION, SOLUTION INTRAVENOUS at 05:51

## 2021-10-06 RX ADMIN — ALBUTEROL SULFATE 10 MG/HR: 5 SOLUTION RESPIRATORY (INHALATION) at 16:09

## 2021-10-06 RX ADMIN — ROCURONIUM BROMIDE 16 MCG/KG/MIN: 10 INJECTION, SOLUTION INTRAVENOUS at 08:12

## 2021-10-06 RX ADMIN — ACETAMINOPHEN 650 MG: 325 TABLET, FILM COATED ORAL at 05:33

## 2021-10-06 RX ADMIN — DOCUSATE SODIUM 50 MG AND SENNOSIDES 8.6 MG 2 TABLET: 8.6; 5 TABLET, FILM COATED ORAL at 05:32

## 2021-10-06 RX ADMIN — SODIUM CHLORIDE 3 G: 900 INJECTION INTRAVENOUS at 23:21

## 2021-10-06 RX ADMIN — MINERAL OIL, PETROLATUM 1 APPLICATION: 425; 573 OINTMENT OPHTHALMIC at 21:50

## 2021-10-06 RX ADMIN — ROCURONIUM BROMIDE 16 MCG/KG/MIN: 10 INJECTION, SOLUTION INTRAVENOUS at 04:03

## 2021-10-06 RX ADMIN — PROPOFOL 55 MCG/KG/MIN: 10 INJECTION, EMULSION INTRAVENOUS at 05:34

## 2021-10-06 RX ADMIN — PROPOFOL 55 MCG/KG/MIN: 10 INJECTION, EMULSION INTRAVENOUS at 02:19

## 2021-10-06 RX ADMIN — ROCURONIUM BROMIDE 14 MCG/KG/MIN: 10 INJECTION, SOLUTION INTRAVENOUS at 01:48

## 2021-10-06 RX ADMIN — PROPOFOL 65 MCG/KG/MIN: 10 INJECTION, EMULSION INTRAVENOUS at 11:28

## 2021-10-06 RX ADMIN — SODIUM CHLORIDE 3 G: 900 INJECTION INTRAVENOUS at 18:10

## 2021-10-06 RX ADMIN — SODIUM CHLORIDE 4 MG/KG/HR: 9 INJECTION, SOLUTION INTRAVENOUS at 08:50

## 2021-10-06 RX ADMIN — MIDAZOLAM HYDROCHLORIDE 3 MG: 1 INJECTION, SOLUTION INTRAMUSCULAR; INTRAVENOUS at 03:44

## 2021-10-06 RX ADMIN — ROCURONIUM BROMIDE 39.5 MG: 10 INJECTION, SOLUTION INTRAVENOUS at 01:47

## 2021-10-06 RX ADMIN — HYDROMORPHONE HYDROCHLORIDE 3 MG/HR: 2 INJECTION INTRAMUSCULAR; INTRAVENOUS; SUBCUTANEOUS at 08:12

## 2021-10-06 RX ADMIN — PROPOFOL 65 MCG/KG/MIN: 10 INJECTION, EMULSION INTRAVENOUS at 14:44

## 2021-10-06 RX ADMIN — ALBUTEROL SULFATE 20 MG/HR: 5 SOLUTION RESPIRATORY (INHALATION) at 09:00

## 2021-10-06 RX ADMIN — ALBUTEROL SULFATE 20 MG/HR: 5 SOLUTION RESPIRATORY (INHALATION) at 00:02

## 2021-10-06 NOTE — PROGRESS NOTES
Woman called claiming to be patients grandmother asking for information, encouraged woman to call point of contact, Jd

## 2021-10-06 NOTE — PROGRESS NOTES
Pulmonary/Critical Care Medicine    Cross Coverage Note    RN called for elevated WBC of 30.7. Patient intubated, vented. Sedated. Paralized. On Dilaudid, Ketamine and Vec drips. No fever or tachycardia. BP normotensive. On high dose steroids. Possible reactive vs sepsis syndrome. Will obtain pan culture, lactic and procal then determine appropriate abx therapy. Discussed case and  plan of care with RN, RT Dr Root.

## 2021-10-06 NOTE — PROGRESS NOTES
Critical Care Progress Note    Date of admission  10/4/2021    Chief Complaint  26 y.o. female admitted 10/4/2021 with status asthmaticus    Hospital Course  10/4 admitted to ICU  10/4 heliox via HFNC, continuous albuterol, magnesium  10/4 intubated for acute hypoxic resp failure, ketamine gtt, propofol gtt      Interval Problem Update  Reviewed last 24 hour events:  Remains critically ill  Continuous albuterol was resumed yesterday am at rate of 12ml/hr  Sedated with propofol and dilaudid gtt  On ketamine. Dose increased to 4mg/kg/hr  Paralyzed on rocuronium  PIP in 50-60s, this am improving in 40s.   Pplat in 25-28s.   CXR without any infiltrates.   ABG 7.23/54/104  Pt is on steroids 62.5 Q6H    Review of Systems  Review of Systems   Reason unable to perform ROS: intubated, sedated, RASS -4, unable to perform.        Vital Signs for last 24 hours   Temp:  [35.6 °C (96.1 °F)-37.5 °C (99.5 °F)] 37.5 °C (99.5 °F)  Pulse:  [107-124] 122  Resp:  [23-24] 24  BP: ()/(38-63) 108/47  SpO2:  [90 %-98 %] 90 %    Hemodynamic parameters for last 24 hours       Respiratory Information for the last 24 hours  Vent Mode: APVCMV  Rate (breaths/min): 24  Vt Target (mL): 370  PEEP/CPAP: 8  MAP: 19  Control VTE (exp VT): 363    Physical Exam   Physical Exam  Vitals and nursing note reviewed.   Constitutional:       General: She is not in acute distress.     Appearance: She is ill-appearing. She is not toxic-appearing or diaphoretic.      Comments: Intubated, sedated   HENT:      Head: Normocephalic.      Mouth/Throat:      Mouth: Mucous membranes are dry.      Comments: ET tube in place  Cardiovascular:      Rate and Rhythm: Regular rhythm.      Pulses: Normal pulses.      Heart sounds: Normal heart sounds. No murmur heard.     Pulmonary:      Effort: No respiratory distress.      Breath sounds: Rhonchi and rales present. No wheezing.      Comments: Wheezing resolved, but rhonchi all over.   Good air entry  + rhonchi  Abdominal:       General: There is no distension.      Palpations: Abdomen is soft.      Tenderness: There is no abdominal tenderness. There is no guarding.   Musculoskeletal:      Right lower leg: No edema.      Left lower leg: No edema.   Skin:     Capillary Refill: Capillary refill takes 2 to 3 seconds.      Coloration: Skin is not jaundiced.      Findings: No bruising or rash.   Neurological:      Comments: Intubated, sedated         Medications  Current Facility-Administered Medications   Medication Dose Route Frequency Provider Last Rate Last Admin   • propofol (DIPRIVAN) injection  0-80 mcg/kg/min Intravenous Continuous Henrry Russell D.O.       • HYDROmorphone (DILAUDID) 1 mg/mL in 50mL NS (HIGH ALERT - Non-Standard Continuous Infusion Concentration)   Intravenous Continuous Henrry Russell D.O.       • norepinephrine (Levophed) 8 mg in 250 mL NS infusion (premix)  0-30 mcg/min Intravenous Continuous LUKASZ TejedaRSharronNSharron   Stopped at 10/05/21 1100   • albuterol (PROVENTIL) 100 mg in NS 60 mL for continuous nebulization  20 mg/hr Nebulization RT-Continuous Henrry Russell D.O. 6 mL/hr at 10/06/21 1120 10 mg/hr at 10/06/21 1120   • insulin regular (HumuLIN R,NovoLIN R) injection  2-9 Units Subcutaneous Q6HRS Henrry Russell D.O.   2 Units at 10/05/21 1736    And   • glucose 4 g chewable tablet 16 g  16 g Oral Q15 MIN PRN CARLA Cheek.O.        And   • dextrose 50% (D50W) injection 50 mL  50 mL Intravenous Q15 MIN PRN Henrry Russell D.O.       • ketamine 1,000 mg in  mL infusion (critical care only)  4 mg/kg/hr Intravenous Continuous Henrry Russell D.O. 131.6 mL/hr at 10/06/21 1327 4 mg/kg/hr at 10/06/21 1327   • HYDROmorphone (Dilaudid) injection 1-2 mg  1-2 mg Intravenous Q HOUR PRN Henrry Russell D.O.   2 mg at 10/06/21 0604   • labetalol (NORMODYNE/TRANDATE) injection 10 mg  10 mg Intravenous Q2HRS PRN Henrry Russell D.O.       • methylPREDNISolone sod succ (SOLU-MEDROL) 125 MG injection 62.5 mg  62.5 mg Intravenous Q6HRS Robby AGUIRRE  SHARON Ham   62.5 mg at 10/06/21 1212   • senna-docusate (PERICOLACE or SENOKOT S) 8.6-50 MG per tablet 2 Tablet  2 Tablet Oral BID Robby Ham M.D.   2 Tablet at 10/06/21 0532    And   • polyethylene glycol/lytes (MIRALAX) PACKET 1 Packet  1 Packet Oral QDAY PRN Robby Ham M.D.        And   • magnesium hydroxide (MILK OF MAGNESIA) suspension 30 mL  30 mL Oral QDAY PRN Robby Ham M.D.        And   • bisacodyl (DULCOLAX) suppository 10 mg  10 mg Rectal QDAY PRN Robby Ham M.D.       • enoxaparin (LOVENOX) inj 40 mg  40 mg Subcutaneous DAILY Robby Ham M.D.   40 mg at 10/06/21 0533   • acetaminophen (Tylenol) tablet 650 mg  650 mg Oral Q6HRS PRN Robby Ham M.D.   650 mg at 10/06/21 0533   • ondansetron (ZOFRAN) syringe/vial injection 4 mg  4 mg Intravenous Q4HRS PRN Robby Ham M.D.       • ondansetron (ZOFRAN ODT) dispertab 4 mg  4 mg Oral Q4HRS PRSANTA Ham M.D.       • promethazine (PHENERGAN) tablet 12.5-25 mg  12.5-25 mg Oral Q4HRS PRSANTA Ham M.D.       • promethazine (PHENERGAN) suppository 12.5-25 mg  12.5-25 mg Rectal Q4HRS PRSANTA Ham M.D.       • prochlorperazine (COMPAZINE) injection 5-10 mg  5-10 mg Intravenous Q4HRS PRSANTA Ham M.D.       • guaiFENesin dextromethorphan (ROBITUSSIN DM) 100-10 MG/5ML syrup 10 mL  10 mL Oral Q6HRS PRN Robby Ham M.D.       • albuterol inhaler 2 Puff  2 Puff Inhalation Q2HRS PRSANTA Ham M.D.   2 Puff at 10/04/21 2009   • artificial tears (EYE LUBRICANT) ophth ointment 1 Application  1 Application Both Eyes Q8HRS Robby Ham M.D.   1 Application at 10/06/21 0534   • Respiratory Therapy Consult   Nebulization Continuous RT Robby Ham M.D.       • famotidine (PEPCID) tablet 20 mg  20 mg Enteral Tube Q12HRS Robby Ham M.D.   20 mg at 10/06/21 0532    Or   • famotidine (PEPCID) injection 20 mg  20 mg Intravenous Q12HRS Robby Ham M.D.   20 mg at  10/05/21 1750   • MD Alert...ICU Electrolyte Replacement per Pharmacy   Other PHARMACY TO DOSE Robby Ham M.D.       • lidocaine (XYLOCAINE) 1 % injection 2 mL  2 mL Tracheal Tube Q30 MIN PRN Robby Ham M.D.           Fluids    Intake/Output Summary (Last 24 hours) at 10/6/2021 1332  Last data filed at 10/6/2021 0600  Gross per 24 hour   Intake 2044.74 ml   Output 1105 ml   Net 939.74 ml       Laboratory  Recent Labs     10/05/21  0744 10/05/21  1206 10/06/21  0013   ISTATAPH 7.263* 7.238* 7.235*   ISTATAPCO2 43.4* 51.6* 53.9*   ISTATAPO2 116* 104* 104*   ISTATATCO2 21 24 24   ZWMJPOP4YOL 98 97 97   ISTATARTHCO3 19.6 22.0 22.8   ISTATARTBE -7* -6* -5*   ISTATTEMP 35.8 C 96.4 F 37.0 C   ISTATFIO2 50 40 40   ISTATSPEC Arterial Arterial Arterial   ISTATAPHTC 7.280* 7.255* 7.235*   DLNJOHWD3RR 109* 97* 104*         Recent Labs     10/05/21  0109 10/05/21  1627 10/06/21  0340   SODIUM 140 141 146*   POTASSIUM 4.3 3.9 4.3   CHLORIDE 109 112 117*   CO2 15* 18* 20   BUN 10 16 15   CREATININE 0.73 0.78 0.76   MAGNESIUM 2.3  --  2.8*   PHOSPHORUS 4.6*  --  3.1   CALCIUM 8.1* 7.9* 7.5*     Recent Labs     10/04/21  1600 10/04/21  1600 10/05/21  0109 10/05/21  1627 10/06/21  0340   ALTSGPT 10  --  12  --  13   ASTSGOT 18  --  22  --  20   ALKPHOSPHAT 68  --  69  --  57   TBILIRUBIN <0.2  --  <0.2  --  <0.2   GLUCOSE 163*   < > 216* 177* 137*    < > = values in this interval not displayed.     Recent Labs     10/04/21  1600 10/05/21  0109 10/05/21  0356 10/06/21  0340   WBC 12.0*  --  11.4* 30.7*   NEUTSPOLYS 52.90  --   --   --    LYMPHOCYTES 34.30  --   --   --    MONOCYTES 8.00  --   --   --    EOSINOPHILS 4.00  --   --   --    BASOPHILS 0.30  --   --   --    ASTSGOT 18 22  --  20   ALTSGPT 10 12  --  13   ALKPHOSPHAT 68 69  --  57   TBILIRUBIN <0.2 <0.2  --  <0.2     Recent Labs     10/04/21  1600 10/05/21  0356 10/05/21  1440 10/06/21  0340   RBC 4.18* 3.66*  --  3.47*   HEMOGLOBIN 12.9 11.6*  --  10.8*    HEMATOCRIT 39.4 34.8*  --  33.4*   PLATELETCT 336 264  --  288   PROTHROMBTM  --   --  14.2  --    INR  --   --  1.13  --        Imaging  X-Ray:  I have personally reviewed the images and compared with prior images.    Assessment/Plan  Acute respiratory failure with hypoxia and hypercapnia (HCC)  Assessment & Plan  Secondary to status asthmaticus  Unclear precipitating event. Influenza/RSV/SARS CoV-2 PCR negative   UDS + meth and PCP  Pt did get one dose of COVID vaccine on 9/22/2021  Chest x-ray unremarkable  Intubation complicated by aspiration during RSI  WBC 30K, procal 1.2    Bronchospasm overall improving.     Plan:   Lung protective ventilation strategies 6cc/kg, paO2 >60, pH >7.25  Repeat ABG  Optimize oxygenation, ventilation, and acid base balance  ABCDEF Bundle   Deep sedation and paralysis  Check coags, lactate  Start unasyn, treat for 5 days    Severe asthma with status asthmaticus  Assessment & Plan  Hypoxic at 40% upon arrival of EMS. CXR unremarkable  After multiple rounds of ketamine IVP, continuous albuterol/heliox, steroids, magnesium, the patient became hypercapnic, more hypoxic and required mechanical ventilation and paralyzed  Solumedrol 62.5 mg q6 hours  10/5 continuous albuterol was resumed and increased at 12mg/hr  10/5 ketamine dose was increased to 4mg/kg/hr  10/5 Pt has no/very minimal autoPEEP. Rate was decreased from 32 to 24 with frequent ABGs.     Plan:   Continue continuous albuterol, will decrease to 6mg/hr.   Continue ketamine to 4mg/kg/hr.    Will discontinue paralytics  Continue propofol and dilaudid gtt  No SAT/SBT due to hemodynamic instability   Serial ABGs  Goal SpO2 > 92%       VTE:  Heparin  Ulcer: H2 Antagonist  Lines: Central Line  Ongoing indication addressed, Arterial Line  Ongoing indication addressed and Delgadillo Catheter  Ongoing indication addressed    I have performed a physical exam and reviewed and updated ROS and Plan today (10/6/2021). In review of yesterday's  note (10/5/2021), there are no changes except as documented above.     Discussed patient condition and risk of morbidity and/or mortality with RN, RT, Pharmacy and Charge nurse / hot rounds  The patient remains critically ill.  Critical care time = 50 minutes in directly providing and coordinating critical care and extensive data review.  No time overlap and excludes procedures.

## 2021-10-06 NOTE — PROGRESS NOTES
4 Eyes Skin Assessment Completed by KANU Marcelino and KANU Castillo.    Head WDL  Ears WDL  Nose WDL  Mouth WDL  Neck WDL  Breast/Chest WDL  Shoulder Blades WDL  Spine WDL  (R) Arm/Elbow/Hand Bruising  (L) Arm/Elbow/Hand WDL  Abdomen WDL  Groin WDL  Scrotum/Coccyx/Buttocks WDL  (R) Leg WDL  (L) Leg WDL  (R) Heel/Foot/Toe WDL  (L) Heel/Foot/Toe WDL          Devices In Places ECG, Blood Pressure Cuff, Pulse Ox, Delgadillo, Arterial Line, SCD's, ET Tube and Central Line      Interventions In Place Pillows, Q2 Turns, Low Air Loss Mattress and Barrier Cream    Possible Skin Injury No    Pictures Uploaded Into Epic N/A  Wound Consult Placed N/A  RN Wound Prevention Protocol Ordered Yes

## 2021-10-06 NOTE — PROGRESS NOTES
Woman called claiming to be this woman's aunt asking for imformation, encouraed to call contact Polo.. Woman started cursing at me.

## 2021-10-06 NOTE — PROGRESS NOTES
tech from Lab called with critical result of WBC 30.7 at 0414. Critical lab result read back to .   HUGO Jimenez notified of critical lab result at 0415.  Critical lab result read back by Serenity ARIAS.

## 2021-10-06 NOTE — CARE PLAN
The patient is Watcher - Medium risk of patient condition declining or worsening    Shift Goals  Clinical Goals: Oxygenation, improved lung sounds  Patient Goals: SANDRA  Family Goals: improvement in condition    Progress made toward(s) clinical / shift goals:       Problem: Skin Integrity  Goal: Skin integrity is maintained or improved  Outcome: Progressing     Problem: Fall Risk  Goal: Patient will remain free from falls  Outcome: Progressing     Problem: Psychosocial  Goal: Patient's level of anxiety will decrease  Outcome: Progressing     Problem: Hemodynamics  Goal: Patient's hemodynamics, fluid balance and neurologic status will be stable or improve  Outcome: Progressing     Problem: Fluid Volume  Goal: Fluid volume balance will be maintained  Outcome: Progressing     Problem: Mechanical Ventilation  Goal: Safe management of artificial airway and ventilation  Outcome: Progressing     Problem: Urinary Elimination  Goal: Establish and maintain regular urinary output  Outcome: Progressing     Problem: Infection - Standard  Goal: Patient will remain free from infection  Outcome: Progressing       Patient is not progressing towards the following goals:      Problem: Knowledge Deficit - Standard  Goal: Patient and family/care givers will demonstrate understanding of plan of care, disease process/condition, diagnostic tests and medications  Outcome: Not Progressing     Problem: Psychosocial  Goal: Patient's ability to verbalize feelings about condition will improve  Outcome: Not Progressing     Problem: Communication  Goal: The ability to communicate needs accurately and effectively will improve  Outcome: Not Progressing     Problem: Respiratory  Goal: Patient will achieve/maintain optimum respiratory ventilation and gas exchange  Outcome: Not Progressing     Problem: Mechanical Ventilation  Goal: Successful weaning off mechanical ventilator, spontaneously maintains adequate gas exchange  Outcome: Not Progressing  Goal:  Patient will be able to express needs and understand communication  Outcome: Not Progressing     Problem: Nutrition  Goal: Enteral nutrition will be maintained or improve  Outcome: Not Progressing  Goal: Enteral nutrition will be maintained or improve  Outcome: Not Progressing     Problem: Bowel Elimination  Goal: Establish and maintain regular bowel function  Outcome: Not Progressing     Problem: Mobility  Goal: Patient's capacity to carry out activities will improve  Outcome: Not Progressing     Problem: Self Care  Goal: Patient will have the ability to perform ADLs independently or with assistance (bathe, groom, dress, toilet and feed)  Outcome: Not Progressing

## 2021-10-06 NOTE — PROGRESS NOTES
Receive another phone call from woman claiming to be the patients sister asking for information. Encouraged to call patient contact.

## 2021-10-07 ENCOUNTER — APPOINTMENT (OUTPATIENT)
Dept: RADIOLOGY | Facility: MEDICAL CENTER | Age: 26
DRG: 208 | End: 2021-10-07
Attending: INTERNAL MEDICINE
Payer: MEDICAID

## 2021-10-07 PROBLEM — F19.20 POLYSUBSTANCE (INCLUDING OPIOIDS) DEPENDENCE, DAILY USE (HCC): Status: ACTIVE | Noted: 2021-10-07

## 2021-10-07 PROBLEM — F11.20 POLYSUBSTANCE (INCLUDING OPIOIDS) DEPENDENCE, DAILY USE (HCC): Status: ACTIVE | Noted: 2021-10-07

## 2021-10-07 LAB
ALBUMIN SERPL BCP-MCNC: 2.8 G/DL (ref 3.2–4.9)
ALBUMIN/GLOB SERPL: 0.9 G/DL
ALP SERPL-CCNC: 49 U/L (ref 30–99)
ALT SERPL-CCNC: 8 U/L (ref 2–50)
ANION GAP SERPL CALC-SCNC: 7 MMOL/L (ref 7–16)
AST SERPL-CCNC: 15 U/L (ref 12–45)
BASE EXCESS BLDA CALC-SCNC: -1 MMOL/L (ref -4–3)
BASE EXCESS BLDA CALC-SCNC: -3 MMOL/L (ref -4–3)
BASE EXCESS BLDA CALC-SCNC: -4 MMOL/L (ref -4–3)
BASE EXCESS BLDA CALC-SCNC: -4 MMOL/L (ref -4–3)
BILIRUB SERPL-MCNC: <0.2 MG/DL (ref 0.1–1.5)
BODY TEMPERATURE: ABNORMAL DEGREES
BREATHS SETTING VENT: 20
BREATHS SETTING VENT: 24
BREATHS SETTING VENT: 26
BUN SERPL-MCNC: 19 MG/DL (ref 8–22)
CALCIUM SERPL-MCNC: 7.5 MG/DL (ref 8.5–10.5)
CHLORIDE SERPL-SCNC: 122 MMOL/L (ref 96–112)
CO2 BLDA-SCNC: 24 MMOL/L (ref 20–33)
CO2 BLDA-SCNC: 25 MMOL/L (ref 20–33)
CO2 SERPL-SCNC: 22 MMOL/L (ref 20–33)
CREAT SERPL-MCNC: 0.54 MG/DL (ref 0.5–1.4)
DELSYS IDSYS: ABNORMAL
END TIDAL CARBON DIOXIDE IECO2: 29 MMHG
END TIDAL CARBON DIOXIDE IECO2: 40 MMHG
ERYTHROCYTE [DISTWIDTH] IN BLOOD BY AUTOMATED COUNT: 48.6 FL (ref 35.9–50)
GLOBULIN SER CALC-MCNC: 3.1 G/DL (ref 1.9–3.5)
GLUCOSE BLD-MCNC: 140 MG/DL (ref 65–99)
GLUCOSE SERPL-MCNC: 143 MG/DL (ref 65–99)
HCO3 BLDA-SCNC: 22.8 MMOL/L (ref 17–25)
HCO3 BLDA-SCNC: 23.2 MMOL/L (ref 17–25)
HCO3 BLDA-SCNC: 23.9 MMOL/L (ref 17–25)
HCO3 BLDA-SCNC: 24.1 MMOL/L (ref 17–25)
HCT VFR BLD AUTO: 30.6 % (ref 37–47)
HCT VFR BLD AUTO: 31.1 % (ref 37–47)
HGB BLD-MCNC: 9.7 G/DL (ref 12–16)
HGB BLD-MCNC: 9.9 G/DL (ref 12–16)
HOROWITZ INDEX BLDA+IHG-RTO: 182 MM[HG]
HOROWITZ INDEX BLDA+IHG-RTO: 223 MM[HG]
HOROWITZ INDEX BLDA+IHG-RTO: 273 MM[HG]
HOROWITZ INDEX BLDA+IHG-RTO: 288 MM[HG]
MAGNESIUM SERPL-MCNC: 2.8 MG/DL (ref 1.5–2.5)
MCH RBC QN AUTO: 31.1 PG (ref 27–33)
MCHC RBC AUTO-ENTMCNC: 31.8 G/DL (ref 33.6–35)
MCV RBC AUTO: 97.8 FL (ref 81.4–97.8)
MODE IMODE: ABNORMAL
O2/TOTAL GAS SETTING VFR VENT: 40 %
O2/TOTAL GAS SETTING VFR VENT: 60 %
PCO2 BLDA: 41.9 MMHG (ref 26–37)
PCO2 BLDA: 47.8 MMHG (ref 26–37)
PCO2 BLDA: 50.3 MMHG (ref 26–37)
PCO2 BLDA: 52 MMHG (ref 26–37)
PCO2 TEMP ADJ BLDA: 43.8 MMHG (ref 26–37)
PCO2 TEMP ADJ BLDA: 47.8 MMHG (ref 26–37)
PCO2 TEMP ADJ BLDA: 47.9 MMHG (ref 26–37)
PCO2 TEMP ADJ BLDA: 54.3 MMHG (ref 26–37)
PEEP END EXPIRATORY PRESSURE IPEEP: 5 CMH20
PEEP END EXPIRATORY PRESSURE IPEEP: 8 CMH20
PERCENT MINUTE VOLUME IPMV: 100
PH BLDA: 7.27 [PH] (ref 7.4–7.5)
PH BLDA: 7.27 [PH] (ref 7.4–7.5)
PH BLDA: 7.29 [PH] (ref 7.4–7.5)
PH BLDA: 7.37 [PH] (ref 7.4–7.5)
PH TEMP ADJ BLDA: 7.26 [PH] (ref 7.4–7.5)
PH TEMP ADJ BLDA: 7.29 [PH] (ref 7.4–7.5)
PH TEMP ADJ BLDA: 7.29 [PH] (ref 7.4–7.5)
PH TEMP ADJ BLDA: 7.35 [PH] (ref 7.4–7.5)
PHOSPHATE SERPL-MCNC: 1.7 MG/DL (ref 2.5–4.5)
PLATELET # BLD AUTO: 240 K/UL (ref 164–446)
PMV BLD AUTO: 9.5 FL (ref 9–12.9)
PO2 BLDA: 109 MMHG (ref 64–87)
PO2 BLDA: 109 MMHG (ref 64–87)
PO2 BLDA: 115 MMHG (ref 64–87)
PO2 BLDA: 89 MMHG (ref 64–87)
PO2 TEMP ADJ BLDA: 102 MMHG (ref 64–87)
PO2 TEMP ADJ BLDA: 116 MMHG (ref 64–87)
PO2 TEMP ADJ BLDA: 121 MMHG (ref 64–87)
PO2 TEMP ADJ BLDA: 89 MMHG (ref 64–87)
POTASSIUM SERPL-SCNC: 3.9 MMOL/L (ref 3.6–5.5)
PROT SERPL-MCNC: 5.9 G/DL (ref 6–8.2)
RBC # BLD AUTO: 3.18 M/UL (ref 4.2–5.4)
SAO2 % BLDA: 96 % (ref 93–99)
SAO2 % BLDA: 97 % (ref 93–99)
SAO2 % BLDA: 97 % (ref 93–99)
SAO2 % BLDA: 98 % (ref 93–99)
SODIUM SERPL-SCNC: 151 MMOL/L (ref 135–145)
SPECIMEN DRAWN FROM PATIENT: ABNORMAL
TIDAL VOLUME IVT: 340 ML
TIDAL VOLUME IVT: 340 ML
TIDAL VOLUME IVT: 370 ML
WBC # BLD AUTO: 22 K/UL (ref 4.8–10.8)

## 2021-10-07 PROCEDURE — 99291 CRITICAL CARE FIRST HOUR: CPT | Performed by: INTERNAL MEDICINE

## 2021-10-07 PROCEDURE — 37799 UNLISTED PX VASCULAR SURGERY: CPT

## 2021-10-07 PROCEDURE — 80053 COMPREHEN METABOLIC PANEL: CPT

## 2021-10-07 PROCEDURE — 85018 HEMOGLOBIN: CPT

## 2021-10-07 PROCEDURE — 94640 AIRWAY INHALATION TREATMENT: CPT

## 2021-10-07 PROCEDURE — 700105 HCHG RX REV CODE 258: Performed by: INTERNAL MEDICINE

## 2021-10-07 PROCEDURE — 85027 COMPLETE CBC AUTOMATED: CPT

## 2021-10-07 PROCEDURE — A9270 NON-COVERED ITEM OR SERVICE: HCPCS | Performed by: INTERNAL MEDICINE

## 2021-10-07 PROCEDURE — 700111 HCHG RX REV CODE 636 W/ 250 OVERRIDE (IP): Performed by: INTERNAL MEDICINE

## 2021-10-07 PROCEDURE — 770022 HCHG ROOM/CARE - ICU (200)

## 2021-10-07 PROCEDURE — 71045 X-RAY EXAM CHEST 1 VIEW: CPT

## 2021-10-07 PROCEDURE — 94799 UNLISTED PULMONARY SVC/PX: CPT

## 2021-10-07 PROCEDURE — 84100 ASSAY OF PHOSPHORUS: CPT

## 2021-10-07 PROCEDURE — 700102 HCHG RX REV CODE 250 W/ 637 OVERRIDE(OP): Performed by: INTERNAL MEDICINE

## 2021-10-07 PROCEDURE — 36600 WITHDRAWAL OF ARTERIAL BLOOD: CPT

## 2021-10-07 PROCEDURE — 700101 HCHG RX REV CODE 250: Performed by: INTERNAL MEDICINE

## 2021-10-07 PROCEDURE — 85014 HEMATOCRIT: CPT

## 2021-10-07 PROCEDURE — 74018 RADEX ABDOMEN 1 VIEW: CPT

## 2021-10-07 PROCEDURE — 82803 BLOOD GASES ANY COMBINATION: CPT | Mod: 91

## 2021-10-07 PROCEDURE — C9113 INJ PANTOPRAZOLE SODIUM, VIA: HCPCS | Performed by: INTERNAL MEDICINE

## 2021-10-07 PROCEDURE — 83735 ASSAY OF MAGNESIUM: CPT

## 2021-10-07 PROCEDURE — 70450 CT HEAD/BRAIN W/O DYE: CPT

## 2021-10-07 PROCEDURE — 94003 VENT MGMT INPAT SUBQ DAY: CPT

## 2021-10-07 RX ORDER — AZITHROMYCIN 250 MG/1
500 TABLET, FILM COATED ORAL DAILY
Status: DISCONTINUED | OUTPATIENT
Start: 2021-10-07 | End: 2021-10-09

## 2021-10-07 RX ORDER — PANTOPRAZOLE SODIUM 40 MG/10ML
40 INJECTION, POWDER, LYOPHILIZED, FOR SOLUTION INTRAVENOUS 2 TIMES DAILY
Status: DISCONTINUED | OUTPATIENT
Start: 2021-10-07 | End: 2021-10-09

## 2021-10-07 RX ORDER — DEXTROSE MONOHYDRATE 50 MG/ML
INJECTION, SOLUTION INTRAVENOUS CONTINUOUS
Status: DISCONTINUED | OUTPATIENT
Start: 2021-10-07 | End: 2021-10-09

## 2021-10-07 RX ADMIN — METHYLPREDNISOLONE SODIUM SUCCINATE 62.5 MG: 125 INJECTION, POWDER, FOR SOLUTION INTRAMUSCULAR; INTRAVENOUS at 17:30

## 2021-10-07 RX ADMIN — SODIUM CHLORIDE 4 MG/KG/HR: 9 INJECTION, SOLUTION INTRAVENOUS at 10:08

## 2021-10-07 RX ADMIN — SODIUM CHLORIDE 3 G: 900 INJECTION INTRAVENOUS at 05:11

## 2021-10-07 RX ADMIN — DOCUSATE SODIUM 50 MG AND SENNOSIDES 8.6 MG 2 TABLET: 8.6; 5 TABLET, FILM COATED ORAL at 17:30

## 2021-10-07 RX ADMIN — SODIUM CHLORIDE 4 MG/KG/HR: 9 INJECTION, SOLUTION INTRAVENOUS at 05:58

## 2021-10-07 RX ADMIN — HYDROMORPHONE HYDROCHLORIDE 2 MG: 1 INJECTION, SOLUTION INTRAMUSCULAR; INTRAVENOUS; SUBCUTANEOUS at 21:19

## 2021-10-07 RX ADMIN — METHYLPREDNISOLONE SODIUM SUCCINATE 62.5 MG: 125 INJECTION, POWDER, FOR SOLUTION INTRAMUSCULAR; INTRAVENOUS at 23:58

## 2021-10-07 RX ADMIN — ALBUTEROL SULFATE 10 MG/HR: 5 SOLUTION RESPIRATORY (INHALATION) at 12:22

## 2021-10-07 RX ADMIN — HYDROMORPHONE HYDROCHLORIDE: 2 INJECTION INTRAMUSCULAR; INTRAVENOUS; SUBCUTANEOUS at 05:59

## 2021-10-07 RX ADMIN — MINERAL OIL, PETROLATUM 1 APPLICATION: 425; 573 OINTMENT OPHTHALMIC at 05:12

## 2021-10-07 RX ADMIN — POTASSIUM PHOSPHATE, MONOBASIC AND POTASSIUM PHOSPHATE, DIBASIC 30 MMOL: 224; 236 INJECTION, SOLUTION, CONCENTRATE INTRAVENOUS at 09:33

## 2021-10-07 RX ADMIN — DEXTROSE MONOHYDRATE: 50 INJECTION, SOLUTION INTRAVENOUS at 21:11

## 2021-10-07 RX ADMIN — SODIUM CHLORIDE 3 G: 900 INJECTION INTRAVENOUS at 23:58

## 2021-10-07 RX ADMIN — ALBUTEROL SULFATE 10 MG/HR: 5 SOLUTION RESPIRATORY (INHALATION) at 02:22

## 2021-10-07 RX ADMIN — POLYETHYLENE GLYCOL 3350 1 PACKET: 17 POWDER, FOR SOLUTION ORAL at 17:30

## 2021-10-07 RX ADMIN — AZITHROMYCIN 500 MG: 250 TABLET, FILM COATED ORAL at 12:00

## 2021-10-07 RX ADMIN — SODIUM CHLORIDE 4 MG/KG/HR: 9 INJECTION, SOLUTION INTRAVENOUS at 01:49

## 2021-10-07 RX ADMIN — FAMOTIDINE 20 MG: 20 TABLET ORAL at 05:11

## 2021-10-07 RX ADMIN — DEXTROSE MONOHYDRATE: 50 INJECTION, SOLUTION INTRAVENOUS at 12:01

## 2021-10-07 RX ADMIN — PROPOFOL 55 MCG/KG/MIN: 10 INJECTION, EMULSION INTRAVENOUS at 05:11

## 2021-10-07 RX ADMIN — PROPOFOL 65 MCG/KG/MIN: 10 INJECTION, EMULSION INTRAVENOUS at 01:49

## 2021-10-07 RX ADMIN — SODIUM CHLORIDE 3 G: 900 INJECTION INTRAVENOUS at 17:30

## 2021-10-07 RX ADMIN — METHYLPREDNISOLONE SODIUM SUCCINATE 62.5 MG: 125 INJECTION, POWDER, FOR SOLUTION INTRAMUSCULAR; INTRAVENOUS at 05:12

## 2021-10-07 RX ADMIN — ONDANSETRON 4 MG: 2 INJECTION INTRAMUSCULAR; INTRAVENOUS at 21:19

## 2021-10-07 RX ADMIN — PANTOPRAZOLE SODIUM 40 MG: 40 INJECTION, POWDER, LYOPHILIZED, FOR SOLUTION INTRAVENOUS at 17:31

## 2021-10-07 RX ADMIN — SODIUM CHLORIDE 3 G: 900 INJECTION INTRAVENOUS at 12:01

## 2021-10-07 RX ADMIN — HYDROMORPHONE HYDROCHLORIDE 2 MG: 1 INJECTION, SOLUTION INTRAMUSCULAR; INTRAVENOUS; SUBCUTANEOUS at 19:18

## 2021-10-07 RX ADMIN — ENOXAPARIN SODIUM 40 MG: 40 INJECTION SUBCUTANEOUS at 05:12

## 2021-10-07 RX ADMIN — METHYLPREDNISOLONE SODIUM SUCCINATE 62.5 MG: 125 INJECTION, POWDER, FOR SOLUTION INTRAMUSCULAR; INTRAVENOUS at 12:01

## 2021-10-07 RX ADMIN — DOCUSATE SODIUM 50 MG AND SENNOSIDES 8.6 MG 2 TABLET: 8.6; 5 TABLET, FILM COATED ORAL at 05:11

## 2021-10-07 ASSESSMENT — PAIN DESCRIPTION - PAIN TYPE
TYPE: ACUTE PAIN

## 2021-10-07 NOTE — DISCHARGE PLANNING
Care Transition Team Discharge Planning    Anticipated Discharge Disposition: TBD    Action: Lsw noted pt's brother, Lei or ASTER, has no information listed in epic. Lsw sent text to bedside RN Claus and requested this information.    Lsw received [employer] search results and contacted all people who had phone numbers listed as follows:    Lei Jimenez, 48yo, as of 3.8.2006 at phone [phone] in [place], [place]  Lsw called and left VM for Mr. Jimenez. Lsw provided contact information for d/c planner and requested return call.      Moon Sethi, 27yo, as of 4.15.2012 at phone [phone] in [place], [place]  Lsw called and message was garbled but sounded like a male voice.  The mail box is full.    Noa Billy, 47yo, as of 10.6.2013 in [place], [place]  The phone number is out of service    The bedside RN will provide the brother's contact number when able.    Barriers to Discharge: TBD    Plan: When able, Lsw will call pt's reported biological brother, ASTER or Lei and reiterate the medical team can only update one person who will have to provide the info to others in the family.

## 2021-10-07 NOTE — PROGRESS NOTES
MD Phillips updated on pt status. MD updated on gtts, VS and neuro status. Pt able to open eyes occasionally to stimulation however is non responsive to painful stimuli and will not follow commands at this time. Per MD decrease ketamine gtt to 1mg/min/hr and MD to place head-CT order. All verbal orders repeated to MD and placed in Epic

## 2021-10-07 NOTE — DISCHARGE PLANNING
Care Transition Team Discharge Planning    Anticipated Discharge Disposition: TBD    Action: Lsw spoke to pt's brother, Lei 28yo. He is at cell [phone]. He lives in [place]. He reports pt's boyfriend, Polo, lives in [place].    The other people calling are probably his Aunt Sofy, Grandma Shana, and possibly the pt and Lei's sister Clemencia 23yo.    The pt and siblings mother has passed away, and their father is in residential.    Lsw sent voalte text to bedside RN as pt's brother is requesting a medical update.    Barriers to Discharge: TBD    Plan: Lsw will continue to follow, and assist w/ d/c planning.

## 2021-10-07 NOTE — DIETARY
Nutrition Services: Update   Day 3 of admit.  Jessica Jimenez is a 26 y.o. female with admitting DX of status asthmaticus.  NPO x 3 - inadequate nutrition in the ICU.  NGT remains to suction w/ dark brown output.  Per IDT, plan for abdominal x-ray.   Will continue to monitor nutrition POC.

## 2021-10-07 NOTE — DISCHARGE PLANNING
Care Transition Team Discharge Planning    Anticipated Discharge Disposition: TBD    Action: Lsw spoke to Rn paty. She is asking for a [employer] search to be completed. They have the contact for pt's boy friend and brother-ASTER or Lei.    Student is questioning which is more appropriate to make medical decisions for pt. Student further indicates last night the RN had to tell brother, Lei, only two people can be involved.    Student requesting biological brother be placed 1st in order of contact then boyfriend.    LSw indicates only 2 people can visit at bedside, but only ONE person can get medical updates to save time for the medical team to safely take care of their pt load. That one person will then be responsible to assimilate the information w/ rest of interested family.    Lsw requested [employer] search even though biological brother is already involved.    If there is no advanced directive, and pt is not A/O. By policy it goes in this order legal spouse/Biological family: spouse, oldest adult child, parents, siblings.       Barriers to Discharge: TBD    Plan: Lsw will contact [employer] search and call brother to reiterate only one biological relative can call for updates from the medical team. Then the one person will be responsible to tell everyone else independently.

## 2021-10-07 NOTE — PROGRESS NOTES
Critical Care Progress Note    Date of admission  10/4/2021    Chief Complaint  26 y.o. female admitted 10/4/2021 with status asthmaticus    Hospital Course  10/4 admitted to ICU  10/4 heliox via HFNC, continuous albuterol, magnesium  10/4 intubated for acute hypoxic resp failure, ketamine gtt, propofol gtt      Interval Problem Update  Reviewed last 24 hour events:  Neuro: absent gag and cough today while on heavy sedation today.  TOF 4/4.  Lighten sedation.  HR: 110-120 (on continuous neb)  SBP: 140s as sedation comes down  Tmax: 38  GI: NPO, dark brown. BM PTA  I/O: 900 overnight  Lines: R IJ TLC, L art, PIV, jimenez  Mobility: if mental status improves  Resp: significant autoPEEP this morning, 15-50esO8C of autoPEEP.  Dropped rate x2, now down to 20.    Vte: lovenox  PPI/H2:pepcid  Antibx: unasyn for possible PNA. Add azithro x3d      Review of Systems  Review of Systems   Reason unable to perform ROS: intubated, sedated, RASS -4, unable to perform.        Vital Signs for last 24 hours   Temp:  [35.8 °C (96.4 °F)-36.4 °C (97.5 °F)] 36.2 °C (97.2 °F)  Pulse:  [103-128] 128  Resp:  [0-26] 22  BP: ()/(50-86) 121/65  SpO2:  [92 %-98 %] 96 %    Hemodynamic parameters for last 24 hours       Respiratory Information for the last 24 hours  Vent Mode: APVCMV  Rate (breaths/min): 20  Vt Target (mL): 330  PEEP/CPAP: 8  MAP: 21  Control VTE (exp VT): 350    Physical Exam   Physical Exam  Vitals and nursing note reviewed.   Constitutional:       General: She is not in acute distress.     Appearance: She is ill-appearing. She is not toxic-appearing or diaphoretic.      Comments: Intubated, sedated   HENT:      Head: Normocephalic.      Mouth/Throat:      Mouth: Mucous membranes are dry.      Comments: ET tube in place  Eyes:      Pupils: Pupils are equal, round, and reactive to light.   Cardiovascular:      Rate and Rhythm: Regular rhythm. Tachycardia present.      Pulses: Normal pulses.      Heart sounds: Normal heart  sounds. No murmur heard.     Pulmonary:      Effort: No respiratory distress.      Breath sounds: Wheezing and rhonchi present.      Comments: Decent air movement  Abdominal:      General: There is no distension.      Palpations: Abdomen is soft.      Tenderness: There is no abdominal tenderness. There is no guarding.      Comments: Hypoactive bowel sounds   Musculoskeletal:      Right lower leg: No edema.      Left lower leg: No edema.   Skin:     General: Skin is warm and dry.   Neurological:      Comments: Sedated  Gag, cough absent  Pupils normally responsive         Medications  Current Facility-Administered Medications   Medication Dose Route Frequency Provider Last Rate Last Admin   • potassium phosphate IVPB 30 mmol in 500 mL D5W (premix)  30 mmol Intravenous Once Nicole Phillips M.D. 83.3 mL/hr at 10/07/21 0933 30 mmol at 10/07/21 0933   • propofol (DIPRIVAN) injection  0-80 mcg/kg/min Intravenous Continuous Henrry Russell D.O. 11.8 mL/hr at 10/07/21 0904 30 mcg/kg/min at 10/07/21 0904   • HYDROmorphone (DILAUDID) 1 mg/mL in 50mL NS (HIGH ALERT - Non-Standard Continuous Infusion Concentration)   Intravenous Continuous Henrry Russell D.O. 3 mL/hr at 10/06/21 1559 New Bag at 10/07/21 0559   • ampicillin/sulbactam (UNASYN) 3 g in  mL IVPB  3 g Intravenous Q6HRS Henrry Russell D.O.   Stopped at 10/07/21 0541   • albuterol (PROVENTIL) 100 mg in NS 60 mL for continuous nebulization  10 mg/hr Nebulization RT-Continuous Henrry Russell D.O. 6 mL/hr at 10/07/21 0222 10 mg/hr at 10/07/21 0222   • glucose 4 g chewable tablet 16 g  16 g Oral Q15 MIN PRN Henrry Russell D.O.        And   • dextrose 50% (D50W) injection 50 mL  50 mL Intravenous Q15 MIN PRN Henrry Russell D.O.       • ketamine 1,000 mg in  mL infusion (critical care only)  4 mg/kg/hr Intravenous Continuous Henrry Russell D.O. 131.6 mL/hr at 10/07/21 1008 4 mg/kg/hr at 10/07/21 1008   • HYDROmorphone (Dilaudid) injection 1-2 mg  1-2 mg Intravenous Q HOUR PRN Henrry  LILI Russell   2 mg at 10/06/21 0604   • labetalol (NORMODYNE/TRANDATE) injection 10 mg  10 mg Intravenous Q2HRS PRN Henrry Russell D.O.       • methylPREDNISolone sod succ (SOLU-MEDROL) 125 MG injection 62.5 mg  62.5 mg Intravenous Q6HRS Robby Ham M.D.   62.5 mg at 10/07/21 0512   • senna-docusate (PERICOLACE or SENOKOT S) 8.6-50 MG per tablet 2 Tablet  2 Tablet Oral BID Robby Ham M.D.   2 Tablet at 10/07/21 0511    And   • polyethylene glycol/lytes (MIRALAX) PACKET 1 Packet  1 Packet Oral QDAY PRN Robby Ham M.D.        And   • magnesium hydroxide (MILK OF MAGNESIA) suspension 30 mL  30 mL Oral QDAY PRN Robby Ham M.D.        And   • bisacodyl (DULCOLAX) suppository 10 mg  10 mg Rectal QDAY PRN Robby Ham M.D.       • enoxaparin (LOVENOX) inj 40 mg  40 mg Subcutaneous DAILY Robby Ham M.D.   40 mg at 10/07/21 0512   • acetaminophen (Tylenol) tablet 650 mg  650 mg Oral Q6HRS PRN Robby Ham M.D.   650 mg at 10/06/21 0533   • ondansetron (ZOFRAN) syringe/vial injection 4 mg  4 mg Intravenous Q4HRS PRN Robby Ham M.D.       • ondansetron (ZOFRAN ODT) dispertab 4 mg  4 mg Oral Q4HRS PRN Robby Ham M.D.       • promethazine (PHENERGAN) tablet 12.5-25 mg  12.5-25 mg Oral Q4HRS PRN Robby Ham M.D.       • promethazine (PHENERGAN) suppository 12.5-25 mg  12.5-25 mg Rectal Q4HRS PRN Robby Ham M.D.       • prochlorperazine (COMPAZINE) injection 5-10 mg  5-10 mg Intravenous Q4HRS PRN Robby Ham M.D.       • guaiFENesin dextromethorphan (ROBITUSSIN DM) 100-10 MG/5ML syrup 10 mL  10 mL Oral Q6HRS PRN Robby Ham M.D.       • albuterol inhaler 2 Puff  2 Puff Inhalation Q2HRS PRN Robby Ham M.D.   2 Puff at 10/04/21 2009   • artificial tears (EYE LUBRICANT) ophth ointment 1 Application  1 Application Both Eyes Q8HRS Robby Ham M.D.   1 Application at 10/07/21 0512   • Respiratory Therapy Consult   Nebulization Continuous RT  Robby Ham M.D.       • famotidine (PEPCID) tablet 20 mg  20 mg Enteral Tube Q12HRS Robby Ham M.D.   20 mg at 10/07/21 0511    Or   • famotidine (PEPCID) injection 20 mg  20 mg Intravenous Q12HRS Robby Ham M.D.   20 mg at 10/06/21 1811   • MD Alert...ICU Electrolyte Replacement per Pharmacy   Other PHARMACY TO DOSE Robby Ham M.D.       • lidocaine (XYLOCAINE) 1 % injection 2 mL  2 mL Tracheal Tube Q30 MIN PRN Robby Ham M.D.           Fluids    Intake/Output Summary (Last 24 hours) at 10/7/2021 1056  Last data filed at 10/7/2021 0800  Gross per 24 hour   Intake 6518.54 ml   Output 1100 ml   Net 5418.54 ml       Laboratory  Recent Labs     10/06/21  1557 10/07/21  0114 10/07/21  0902   ISTATAPH 7.269* 7.272* 7.287*   ISTATAPCO2 48.8* 50.3* 47.8*   ISTATAPO2 66 109* 89*   ISTATATCO2 24 25 24   [id number] 90* 97 96   ISTATARTHCO3 22.3 23.2 22.8   ISTATARTBE -5* -4 -4   ISTATTEMP 97.3 F 35.9 C 37.0 C   ISTATFIO2 40 60 40   ISTATSPEC Arterial Arterial Arterial   ISTATAPHTC 7.279* 7.287* 7.287*   [id number] 63* 102* 89*         Recent Labs     10/05/21  0109 10/05/21  0109 10/05/21  1627 10/06/21  0340 10/07/21  0400   SODIUM 140   < > 141 146* 151*   POTASSIUM 4.3   < > 3.9 4.3 3.9   CHLORIDE 109   < > 112 117* 122*   CO2 15*   < > 18* 20 22   BUN 10   < > 16 15 19   CREATININE 0.73   < > 0.78 0.76 0.54   MAGNESIUM 2.3  --   --  2.8* 2.8*   PHOSPHORUS 4.6*  --   --  3.1 1.7*   CALCIUM 8.1*   < > 7.9* 7.5* 7.5*    < > = values in this interval not displayed.     Recent Labs     10/05/21  0109 10/05/21  0109 10/05/21  1627 10/06/21  0340 10/07/21  0400   ALTSGPT 12  --   --  13 8   ASTSGOT 22  --   --  20 15   ALKPHOSPHAT 69  --   --  57 49   TBILIRUBIN <0.2  --   --  <0.2 <0.2   GLUCOSE 216*   < > 177* 137* 143*    < > = values in this interval not displayed.     Recent Labs     10/04/21  1600 10/04/21  1600 10/05/21  0109 10/05/21  0356 10/06/21  0340 10/07/21  0400   WBC 12.0*   <  >  --  11.4* 30.7* 22.0*   NEUTSPOLYS 52.90  --   --   --   --   --    LYMPHOCYTES 34.30  --   --   --   --   --    MONOCYTES 8.00  --   --   --   --   --    EOSINOPHILS 4.00  --   --   --   --   --    BASOPHILS 0.30  --   --   --   --   --    ASTSGOT 18   < > 22  --  20 15   ALTSGPT 10   < > 12  --  13 8   ALKPHOSPHAT 68   < > 69  --  57 49   TBILIRUBIN <0.2   < > <0.2  --  <0.2 <0.2    < > = values in this interval not displayed.     Recent Labs     10/05/21  0356 10/05/21  1440 10/06/21  0340 10/07/21  0400   RBC 3.66*  --  3.47* 3.18*   HEMOGLOBIN 11.6*  --  10.8* 9.9*   HEMATOCRIT 34.8*  --  33.4* 31.1*   PLATELETCT 264  --  288 240   PROTHROMBTM  --  14.2  --   --    INR  --  1.13  --   --        Imaging  X-Ray:  I have personally reviewed the images and compared with prior images.    Assessment/Plan  * Severe asthma with status asthmaticus  Assessment & Plan  Hypoxic at 40% upon arrival of EMS. CXR unremarkable  After multiple rounds of ketamine IVP, continuous albuterol/heliox, steroids, magnesium, the patient became hypercapnic, more hypoxic and required mechanical ventilation and paralyzed  Solumedrol 62.5 mg q6 hours    Plan:   Continue continuous albuterol, will decrease to 6mg/hr.   Continue ketamine, titrate down dose  Continue propofol and dilaudid gtt  Attempt SAT  Serial ABGs  Goal SpO2 > 92%    Polysubstance (including opioids) dependence, daily use (HCC)  Assessment & Plan  Counseling when appropriate    Acute respiratory failure with hypoxia and hypercapnia (HCC)  Assessment & Plan  Secondary to status asthmaticus  Unclear precipitating event. Influenza/RSV/SARS CoV-2 PCR negative   UDS + meth and PCP  Pt did get one dose of COVID vaccine on 9/22/2021  Chest x-ray unremarkable  Intubation complicated by aspiration during RSI  WBC 30K, procal 1.2    Plan:   Lung protective ventilation strategies 6cc/kg, paO2 >60, pH >7.25  Permissive hypercapnia to allow long expiratory time.  Had high level  instrinsic PEEP on 10/7 on a rate of 30  Trend ABGs  ABCDEF Bundle   Off paralytics  Wean sedation  Cont steroids, cont nebs  Start unasyn, treat for 5 days.  Add azithro 10/7       VTE:  Lovenox  Ulcer: H2 Antagonist  Lines: Central Line  Ongoing indication addressed, Arterial Line  Ongoing indication addressed and Delgadillo Catheter  Ongoing indication addressed    I have performed a physical exam and reviewed and updated ROS and Plan today (10/7/2021). In review of yesterday's note (10/6/2021), there are no changes except as documented above.     Discussed patient condition and risk of morbidity and/or mortality with RN, RT, Pharmacy and Charge nurse / hot rounds  The patient remains critically ill on mechanical ventilation.  I made multiple ventilator changes throughout the day.  Critical care time = 45 minutes in directly providing and coordinating critical care and extensive data review.  No time overlap and excludes procedures.

## 2021-10-08 PROBLEM — K92.0 BLOODY EMESIS: Status: ACTIVE | Noted: 2021-10-08

## 2021-10-08 PROBLEM — K59.00 OBSTIPATION: Status: ACTIVE | Noted: 2021-10-08

## 2021-10-08 PROBLEM — R41.0 DELIRIUM: Status: ACTIVE | Noted: 2021-10-08

## 2021-10-08 LAB
ALBUMIN SERPL BCP-MCNC: 3.1 G/DL (ref 3.2–4.9)
ALBUMIN/GLOB SERPL: 1 G/DL
ALP SERPL-CCNC: 55 U/L (ref 30–99)
ALT SERPL-CCNC: 18 U/L (ref 2–50)
ANION GAP SERPL CALC-SCNC: 8 MMOL/L (ref 7–16)
AST SERPL-CCNC: 29 U/L (ref 12–45)
BILIRUB SERPL-MCNC: 0.2 MG/DL (ref 0.1–1.5)
BUN SERPL-MCNC: 19 MG/DL (ref 8–22)
CALCIUM SERPL-MCNC: 7.9 MG/DL (ref 8.5–10.5)
CHLORIDE SERPL-SCNC: 116 MMOL/L (ref 96–112)
CO2 SERPL-SCNC: 25 MMOL/L (ref 20–33)
CREAT SERPL-MCNC: 0.58 MG/DL (ref 0.5–1.4)
ERYTHROCYTE [DISTWIDTH] IN BLOOD BY AUTOMATED COUNT: 50.8 FL (ref 35.9–50)
GLOBULIN SER CALC-MCNC: 3 G/DL (ref 1.9–3.5)
GLUCOSE SERPL-MCNC: 140 MG/DL (ref 65–99)
HCT VFR BLD AUTO: 30.3 % (ref 37–47)
HGB BLD-MCNC: 9.4 G/DL (ref 12–16)
MAGNESIUM SERPL-MCNC: 2.7 MG/DL (ref 1.5–2.5)
MCH RBC QN AUTO: 30.7 PG (ref 27–33)
MCHC RBC AUTO-ENTMCNC: 31 G/DL (ref 33.6–35)
MCV RBC AUTO: 99 FL (ref 81.4–97.8)
PHOSPHATE SERPL-MCNC: 2.5 MG/DL (ref 2.5–4.5)
PLATELET # BLD AUTO: 231 K/UL (ref 164–446)
PMV BLD AUTO: 9.5 FL (ref 9–12.9)
POTASSIUM SERPL-SCNC: 4.7 MMOL/L (ref 3.6–5.5)
PROT SERPL-MCNC: 6.1 G/DL (ref 6–8.2)
RBC # BLD AUTO: 3.06 M/UL (ref 4.2–5.4)
SODIUM SERPL-SCNC: 149 MMOL/L (ref 135–145)
TRIGL SERPL-MCNC: 135 MG/DL (ref 0–149)
WBC # BLD AUTO: 20.4 K/UL (ref 4.8–10.8)

## 2021-10-08 PROCEDURE — 700105 HCHG RX REV CODE 258: Performed by: INTERNAL MEDICINE

## 2021-10-08 PROCEDURE — 85027 COMPLETE CBC AUTOMATED: CPT

## 2021-10-08 PROCEDURE — 83735 ASSAY OF MAGNESIUM: CPT

## 2021-10-08 PROCEDURE — 770022 HCHG ROOM/CARE - ICU (200)

## 2021-10-08 PROCEDURE — 80053 COMPREHEN METABOLIC PANEL: CPT

## 2021-10-08 PROCEDURE — 99291 CRITICAL CARE FIRST HOUR: CPT | Performed by: INTERNAL MEDICINE

## 2021-10-08 PROCEDURE — 84478 ASSAY OF TRIGLYCERIDES: CPT

## 2021-10-08 PROCEDURE — 700111 HCHG RX REV CODE 636 W/ 250 OVERRIDE (IP): Performed by: INTERNAL MEDICINE

## 2021-10-08 PROCEDURE — C9113 INJ PANTOPRAZOLE SODIUM, VIA: HCPCS | Performed by: INTERNAL MEDICINE

## 2021-10-08 PROCEDURE — 84100 ASSAY OF PHOSPHORUS: CPT

## 2021-10-08 PROCEDURE — 94003 VENT MGMT INPAT SUBQ DAY: CPT

## 2021-10-08 PROCEDURE — 700101 HCHG RX REV CODE 250: Performed by: INTERNAL MEDICINE

## 2021-10-08 PROCEDURE — 94799 UNLISTED PULMONARY SVC/PX: CPT

## 2021-10-08 PROCEDURE — 94640 AIRWAY INHALATION TREATMENT: CPT

## 2021-10-08 PROCEDURE — 700102 HCHG RX REV CODE 250 W/ 637 OVERRIDE(OP): Performed by: INTERNAL MEDICINE

## 2021-10-08 PROCEDURE — A9270 NON-COVERED ITEM OR SERVICE: HCPCS | Performed by: INTERNAL MEDICINE

## 2021-10-08 PROCEDURE — 94660 CPAP INITIATION&MGMT: CPT

## 2021-10-08 PROCEDURE — 94150 VITAL CAPACITY TEST: CPT

## 2021-10-08 RX ORDER — DEXMEDETOMIDINE HYDROCHLORIDE 4 UG/ML
.1-1.5 INJECTION, SOLUTION INTRAVENOUS CONTINUOUS
Status: DISCONTINUED | OUTPATIENT
Start: 2021-10-08 | End: 2021-10-09

## 2021-10-08 RX ORDER — IPRATROPIUM BROMIDE AND ALBUTEROL SULFATE 2.5; .5 MG/3ML; MG/3ML
3 SOLUTION RESPIRATORY (INHALATION)
Status: DISCONTINUED | OUTPATIENT
Start: 2021-10-08 | End: 2021-10-09

## 2021-10-08 RX ADMIN — ENOXAPARIN SODIUM 40 MG: 40 INJECTION SUBCUTANEOUS at 05:42

## 2021-10-08 RX ADMIN — ALBUTEROL SULFATE 2 PUFF: 90 AEROSOL, METERED RESPIRATORY (INHALATION) at 19:45

## 2021-10-08 RX ADMIN — DEXMEDETOMIDINE 0.2 MCG/KG/HR: 200 INJECTION, SOLUTION INTRAVENOUS at 09:49

## 2021-10-08 RX ADMIN — DEXMEDETOMIDINE 0.6 MCG/KG/HR: 200 INJECTION, SOLUTION INTRAVENOUS at 21:26

## 2021-10-08 RX ADMIN — AZITHROMYCIN 500 MG: 250 TABLET, FILM COATED ORAL at 05:41

## 2021-10-08 RX ADMIN — IPRATROPIUM BROMIDE AND ALBUTEROL SULFATE 3 ML: .5; 2.5 SOLUTION RESPIRATORY (INHALATION) at 14:17

## 2021-10-08 RX ADMIN — SODIUM CHLORIDE 3 G: 900 INJECTION INTRAVENOUS at 17:51

## 2021-10-08 RX ADMIN — HYDROMORPHONE HYDROCHLORIDE 1 MG: 1 INJECTION, SOLUTION INTRAMUSCULAR; INTRAVENOUS; SUBCUTANEOUS at 12:26

## 2021-10-08 RX ADMIN — PANTOPRAZOLE SODIUM 40 MG: 40 INJECTION, POWDER, LYOPHILIZED, FOR SOLUTION INTRAVENOUS at 17:51

## 2021-10-08 RX ADMIN — IPRATROPIUM BROMIDE AND ALBUTEROL SULFATE 3 ML: .5; 2.5 SOLUTION RESPIRATORY (INHALATION) at 22:08

## 2021-10-08 RX ADMIN — SODIUM CHLORIDE 3 G: 900 INJECTION INTRAVENOUS at 05:43

## 2021-10-08 RX ADMIN — DEXMEDETOMIDINE 0.4 MCG/KG/HR: 200 INJECTION, SOLUTION INTRAVENOUS at 21:14

## 2021-10-08 RX ADMIN — HYDROMORPHONE HYDROCHLORIDE 1 MG: 1 INJECTION, SOLUTION INTRAMUSCULAR; INTRAVENOUS; SUBCUTANEOUS at 19:45

## 2021-10-08 RX ADMIN — DOCUSATE SODIUM 50 MG AND SENNOSIDES 8.6 MG 2 TABLET: 8.6; 5 TABLET, FILM COATED ORAL at 05:42

## 2021-10-08 RX ADMIN — IPRATROPIUM BROMIDE AND ALBUTEROL SULFATE 3 ML: .5; 2.5 SOLUTION RESPIRATORY (INHALATION) at 18:25

## 2021-10-08 RX ADMIN — SODIUM CHLORIDE 3 G: 900 INJECTION INTRAVENOUS at 11:43

## 2021-10-08 RX ADMIN — ALBUTEROL SULFATE 2.5 MG: 2.5 SOLUTION RESPIRATORY (INHALATION) at 13:44

## 2021-10-08 RX ADMIN — PANTOPRAZOLE SODIUM 40 MG: 40 INJECTION, POWDER, LYOPHILIZED, FOR SOLUTION INTRAVENOUS at 05:42

## 2021-10-08 RX ADMIN — PROPOFOL 5 MCG/KG/MIN: 10 INJECTION, EMULSION INTRAVENOUS at 01:27

## 2021-10-08 RX ADMIN — METHYLPREDNISOLONE SODIUM SUCCINATE 62.5 MG: 125 INJECTION, POWDER, FOR SOLUTION INTRAMUSCULAR; INTRAVENOUS at 05:42

## 2021-10-08 RX ADMIN — HYDROMORPHONE HYDROCHLORIDE 1 MG: 1 INJECTION, SOLUTION INTRAMUSCULAR; INTRAVENOUS; SUBCUTANEOUS at 21:30

## 2021-10-08 RX ADMIN — METHYLPREDNISOLONE SODIUM SUCCINATE 62.5 MG: 125 INJECTION, POWDER, FOR SOLUTION INTRAMUSCULAR; INTRAVENOUS at 17:51

## 2021-10-08 RX ADMIN — HYDROMORPHONE HYDROCHLORIDE 1 MG: 1 INJECTION, SOLUTION INTRAMUSCULAR; INTRAVENOUS; SUBCUTANEOUS at 16:08

## 2021-10-08 RX ADMIN — METHYLPREDNISOLONE SODIUM SUCCINATE 62.5 MG: 125 INJECTION, POWDER, FOR SOLUTION INTRAMUSCULAR; INTRAVENOUS at 11:42

## 2021-10-08 ASSESSMENT — PULMONARY FUNCTION TESTS
FVC: 0
EPAP_CMH2O: 5

## 2021-10-08 ASSESSMENT — PAIN DESCRIPTION - PAIN TYPE
TYPE: ACUTE PAIN

## 2021-10-08 ASSESSMENT — FIBROSIS 4 INDEX: FIB4 SCORE: 0.77

## 2021-10-08 NOTE — ASSESSMENT & PLAN NOTE
Small volume bright red blood in NG output  No melena or hematochezia  Hgb stable from yesterday  Back off to daily PPI (has been on high dose steroids)  Resume lovenox  Monitor for additional e/o bleed.  I suspect this was epistaxis from thrashing with cor-track in place

## 2021-10-08 NOTE — PROGRESS NOTES
Patient with RASS of -1 - +3 throughout shift. Becomes agitated and thrashes with minimal stimulation, at times redirectable and follows commands.

## 2021-10-08 NOTE — ASSESSMENT & PLAN NOTE
Medication induced from sedatives and ICU delirium.  Possibly some component of withdrawal given her polysubstance use  D/c precedex  seroquel 25 BID  Sitter  Avoid delirium inducing meds  Remove all lines/tubes

## 2021-10-08 NOTE — PROGRESS NOTES
Critical Care Progress Note    Date of admission  10/4/2021    Chief Complaint  26 y.o. female admitted 10/4/2021 with status asthmaticus    Hospital Course  10/4 admitted to ICU  10/4 heliox via HFNC, continuous albuterol, magnesium  10/4 intubated for acute hypoxic resp failure, ketamine gtt, propofol gtt      Interval Problem Update  Reviewed last 24 hour events:  Neuro: waking up, following but not participating well with SBT  HR: SR 120s  SBP: 130s  Tmax: 38.2  GI: BM PTA. NGT blood secretions. Haven't tube fed  I/O: 1250 overnight  Lines: TLC  Mobility: was too sedated   Resp: %.  Was volume limiting on control.    Vte: hold for bloody NGT output  PPI/H2: pantoprazole BID for bloody NGT output  Antibx: unasyn, azithro    Some blood from NGTs  Na 149    Review of Systems  Review of Systems   Unable to perform ROS: Intubated        Vital Signs for last 24 hours   Temp:  [37.6 °C (99.7 °F)-38.2 °C (100.8 °F)] 37.6 °C (99.7 °F)  Pulse:  [] 110  Resp:  [8-24] 11  BP: (103-141)/(62-86) 109/64  SpO2:  [95 %-98 %] 97 %    Hemodynamic parameters for last 24 hours       Respiratory Information for the last 24 hours  Vent Mode: ASV  Rate (breaths/min): 20  Vt Target (mL): 330  PEEP/CPAP: 5  MAP: 6.4  Control VTE (exp VT): 818    Physical Exam   Physical Exam  Vitals and nursing note reviewed.   Constitutional:       General: She is not in acute distress.     Appearance: She is ill-appearing. She is not toxic-appearing or diaphoretic.      Interventions: She is intubated.   HENT:      Head: Normocephalic.      Mouth/Throat:      Mouth: Mucous membranes are dry.      Comments: ET tube in place  Eyes:      Pupils: Pupils are equal, round, and reactive to light.   Cardiovascular:      Rate and Rhythm: Normal rate and regular rhythm.      Pulses: Normal pulses.      Heart sounds: Normal heart sounds. No murmur heard.     Pulmonary:      Effort: No respiratory distress. She is intubated.      Breath sounds:  Wheezing present. No rhonchi.      Comments: Decent air movement  Abdominal:      General: There is no distension.      Palpations: Abdomen is soft.      Tenderness: There is no abdominal tenderness. There is no guarding.      Comments: Hypoactive bowel sounds   Musculoskeletal:      Right lower leg: No edema.      Left lower leg: No edema.   Skin:     General: Skin is warm and dry.   Neurological:      Comments: Off sedation x15min.  Sleepy but following commands         Medications  Current Facility-Administered Medications   Medication Dose Route Frequency Provider Last Rate Last Admin   • dextrose 5% infusion   Intravenous Continuous Nicole Phillips M.D. 50 mL/hr at 10/07/21 2111 New Bag at 10/07/21 2111   • HYDROmorphone (DILAUDID) 1 mg/mL in 50mL NS (HIGH ALERT - Non-Standard Continuous Infusion Concentration)   Intravenous Continuous Nicole Phillips M.D. 1.5 mL/hr at 10/08/21 0120 Rate Change at 10/08/21 0120   • azithromycin (ZITHROMAX) tablet 500 mg  500 mg Enteral Tube DAILY Nicole Phillips M.D.   500 mg at 10/08/21 0541   • pantoprazole (PROTONIX) injection 40 mg  40 mg Intravenous BID Nicole Phillips M.D.   40 mg at 10/08/21 0542   • propofol (DIPRIVAN) injection  0-80 mcg/kg/min Intravenous Continuous Henrry Russell D.O. 11.8 mL/hr at 10/08/21 0500 30 mcg/kg/min at 10/08/21 0500   • ampicillin/sulbactam (UNASYN) 3 g in  mL IVPB  3 g Intravenous Q6HRS CARLA Cheek.OSharron   Stopped at 10/08/21 0613   • glucose 4 g chewable tablet 16 g  16 g Oral Q15 MIN PRN Henrry Russell D.O.        And   • dextrose 50% (D50W) injection 50 mL  50 mL Intravenous Q15 MIN PRN Henrry Russell D.O.       • ketamine 1,000 mg in  mL infusion (critical care only)  1 mg/kg/hr Intravenous Continuous Nicole Phillips M.D.   Stopped at 10/07/21 1801   • HYDROmorphone (Dilaudid) injection 1-2 mg  1-2 mg Intravenous Q HOUR PRN Henrry Russell D.O.   2 mg at 10/07/21 2119   • labetalol (NORMODYNE/TRANDATE) injection 10 mg  10 mg Intravenous  Q2HRS PRN Henrry Russell D.O.       • methylPREDNISolone sod succ (SOLU-MEDROL) 125 MG injection 62.5 mg  62.5 mg Intravenous Q6HRS Robby Ham M.D.   62.5 mg at 10/08/21 0542   • senna-docusate (PERICOLACE or SENOKOT S) 8.6-50 MG per tablet 2 Tablet  2 Tablet Oral BID Robby Ham M.D.   2 Tablet at 10/08/21 0542    And   • polyethylene glycol/lytes (MIRALAX) PACKET 1 Packet  1 Packet Oral QDAY PRN Robby Ham M.D.   1 Packet at 10/07/21 1730    And   • magnesium hydroxide (MILK OF MAGNESIA) suspension 30 mL  30 mL Oral QDAY PRN Robby Ham M.D.        And   • bisacodyl (DULCOLAX) suppository 10 mg  10 mg Rectal QDAY PRN Robby Ham M.D.       • enoxaparin (LOVENOX) inj 40 mg  40 mg Subcutaneous DAILY Robby Ham M.D.   40 mg at 10/08/21 0542   • acetaminophen (Tylenol) tablet 650 mg  650 mg Oral Q6HRS PRN Robby Ham M.D.   650 mg at 10/06/21 0533   • ondansetron (ZOFRAN) syringe/vial injection 4 mg  4 mg Intravenous Q4HRS PRN Robby Ham M.D.   4 mg at 10/07/21 2119   • ondansetron (ZOFRAN ODT) dispertab 4 mg  4 mg Oral Q4HRS PRN Robby Ham M.D.       • promethazine (PHENERGAN) tablet 12.5-25 mg  12.5-25 mg Oral Q4HRS PRN Robby Ham M.D.       • promethazine (PHENERGAN) suppository 12.5-25 mg  12.5-25 mg Rectal Q4HRS PRSANTA Ham M.D.       • prochlorperazine (COMPAZINE) injection 5-10 mg  5-10 mg Intravenous Q4HRS PRN Robby Ham M.D.       • guaiFENesin dextromethorphan (ROBITUSSIN DM) 100-10 MG/5ML syrup 10 mL  10 mL Oral Q6HRS PRN Robby Ham M.D.       • albuterol inhaler 2 Puff  2 Puff Inhalation Q2HRS PRN Robby Ham M.D.   2 Puff at 10/04/21 2009   • Respiratory Therapy Consult   Nebulization Continuous RT Robby Ham M.D.       • MD Alert...ICU Electrolyte Replacement per Pharmacy   Other PHARMACY TO DOSE Robby Ham M.D.       • lidocaine (XYLOCAINE) 1 % injection 2 mL  2 mL Tracheal Tube Q30 MIN PRN Robby  CARLA Ham M.D.           Fluids    Intake/Output Summary (Last 24 hours) at 10/8/2021 0759  Last data filed at 10/8/2021 0600  Gross per 24 hour   Intake 1936.87 ml   Output 2400 ml   Net -463.13 ml       Laboratory  Recent Labs     10/07/21  0902 10/07/21  1125 10/07/21  1737   ISTATAPH 7.287* 7.270* 7.368*   ISTATAPCO2 47.8* 52.0* 41.9*   ISTATAPO2 89* 109* 115*   ISTATATCO2 24 25 25   [id number] 96 97 98   ISTATARTHCO3 22.8 23.9 24.1   ISTATARTBE -4 -3 -1   ISTATTEMP 37.0 C 38.0 C 38.0 C   ISTATFIO2 40 40 40   ISTATSPEC Arterial Arterial Arterial   ISTATAPHTC 7.287* 7.256* 7.353*   [id number] 89* 116* 121*         Recent Labs     10/06/21  0340 10/07/21  0400 10/08/21  0405   SODIUM 146* 151* 149*   POTASSIUM 4.3 3.9 4.7   CHLORIDE 117* 122* 116*   CO2 20 22 25   BUN 15 19 19   CREATININE 0.76 0.54 0.58   MAGNESIUM 2.8* 2.8* 2.7*   PHOSPHORUS 3.1 1.7* 2.5   CALCIUM 7.5* 7.5* 7.9*     Recent Labs     10/06/21  0340 10/07/21  0400 10/08/21  0405   ALTSGPT 13 8 18   ASTSGOT 20 15 29   ALKPHOSPHAT 57 49 55   TBILIRUBIN <0.2 <0.2 0.2   GLUCOSE 137* 143* 140*     Recent Labs     10/06/21  0340 10/07/21  0400 10/08/21  0405   WBC 30.7* 22.0* 20.4*   ASTSGOT 20 15 29   ALTSGPT 13 8 18   ALKPHOSPHAT 57 49 55   TBILIRUBIN <0.2 <0.2 0.2     Recent Labs     10/05/21  1440 10/06/21  0340 10/06/21  0340 10/07/21  0400 10/07/21  1755 10/08/21  0405   RBC  --  3.47*  --  3.18*  --  3.06*   HEMOGLOBIN  --  10.8*   < > 9.9* 9.7* 9.4*   HEMATOCRIT  --  33.4*   < > 31.1* 30.6* 30.3*   PLATELETCT  --  288  --  240  --  231   PROTHROMBTM 14.2  --   --   --   --   --    INR 1.13  --   --   --   --   --     < > = values in this interval not displayed.       Imaging  X-Ray:  I have personally reviewed the images and compared with prior images.    Assessment/Plan  * Severe asthma with status asthmaticus  Assessment & Plan  Hypoxic at 40% upon arrival of EMS. CXR unremarkable  After multiple rounds of ketamine IVP, continuous  albuterol/heliox, steroids, magnesium, the patient became hypercapnic, more hypoxic and required mechanical ventilation and paralyzed  Solumedrol 62.5 mg q6 hours    Plan:   q4h nebs  Extubate to bipap.  Mental state not perfect so not following well for parameters, but her RSBI is excellent and lung mechanics much better than prior   precedex during extubation  Serial ABGs  Goal SpO2 > 92%    Pulled off bipap after extubation.  Desat to low 80s.  Severely agitated, screaming, non-redirectable.  Continue precedex, bipap, restraints.  At risk for needing reintubation    Obstipation  Assessment & Plan  Mag citrate    Delirium  Assessment & Plan  Medication induced from sedatives and ICU delirium.  Possibly some component of withdrawal given her polysubstance use  Continue precedex for severe agitation and pulling off bipap  Avoid additional benzos or sedatives as able  Frequent reorientation    Bloody emesis  Assessment & Plan  Small volume bright red blood in NG output  No melena or hematochezia  Hgb stable from yesterday  BID PPI  Hold lovenox  Monitor for more clinical e/o GIB    Polysubstance (including opioids) dependence, daily use (HCC)  Assessment & Plan  Counseling when appropriate    Acute respiratory failure with hypoxia and hypercapnia (HCC)  Assessment & Plan  Secondary to status asthmaticus  Covering CAP  Extubate to bipap 10/8       VTE:  Lovenox  Ulcer: H2 Antagonist  Lines: Central Line  Ongoing indication addressed, Arterial Line  Ongoing indication addressed and Delgadillo Catheter  Ongoing indication addressed    I have performed a physical exam and reviewed and updated ROS and Plan today (10/8/2021). In review of yesterday's note (10/7/2021), there are no changes except as documented above.     Discussed patient condition and risk of morbidity and/or mortality with RN, RT, Pharmacy and Charge nurse / hot rounds  The patient remains critically ill on mechanical ventilation then bipap. Critical care time  = 60 minutes in directly providing and coordinating critical care and extensive data review.  No time overlap and excludes procedures.

## 2021-10-09 PROBLEM — D64.9 ANEMIA: Status: ACTIVE | Noted: 2021-10-09

## 2021-10-09 PROBLEM — E66.3 OVERWEIGHT (BMI 25.0-29.9): Status: ACTIVE | Noted: 2021-10-09

## 2021-10-09 LAB
ALBUMIN SERPL BCP-MCNC: 3.2 G/DL (ref 3.2–4.9)
ALBUMIN/GLOB SERPL: 1 G/DL
ALP SERPL-CCNC: 57 U/L (ref 30–99)
ALT SERPL-CCNC: 25 U/L (ref 2–50)
ANION GAP SERPL CALC-SCNC: 8 MMOL/L (ref 7–16)
AST SERPL-CCNC: 27 U/L (ref 12–45)
BILIRUB SERPL-MCNC: 0.2 MG/DL (ref 0.1–1.5)
BUN SERPL-MCNC: 19 MG/DL (ref 8–22)
CALCIUM SERPL-MCNC: 8.5 MG/DL (ref 8.5–10.5)
CHLORIDE SERPL-SCNC: 107 MMOL/L (ref 96–112)
CO2 SERPL-SCNC: 27 MMOL/L (ref 20–33)
CREAT SERPL-MCNC: 0.46 MG/DL (ref 0.5–1.4)
ERYTHROCYTE [DISTWIDTH] IN BLOOD BY AUTOMATED COUNT: 47.6 FL (ref 35.9–50)
FERRITIN SERPL-MCNC: 42.8 NG/ML (ref 10–291)
GLOBULIN SER CALC-MCNC: 3.1 G/DL (ref 1.9–3.5)
GLUCOSE BLD-MCNC: 146 MG/DL (ref 65–99)
GLUCOSE BLD-MCNC: 177 MG/DL (ref 65–99)
GLUCOSE BLD-MCNC: 66 MG/DL (ref 65–99)
GLUCOSE BLD-MCNC: 77 MG/DL (ref 65–99)
GLUCOSE BLD-MCNC: 78 MG/DL (ref 65–99)
GLUCOSE SERPL-MCNC: 162 MG/DL (ref 65–99)
HCT VFR BLD AUTO: 31.9 % (ref 37–47)
HGB BLD-MCNC: 10.2 G/DL (ref 12–16)
IRON SATN MFR SERPL: 14 % (ref 15–55)
IRON SERPL-MCNC: 38 UG/DL (ref 40–170)
MAGNESIUM SERPL-MCNC: 2.5 MG/DL (ref 1.5–2.5)
MCH RBC QN AUTO: 31.2 PG (ref 27–33)
MCHC RBC AUTO-ENTMCNC: 32 G/DL (ref 33.6–35)
MCV RBC AUTO: 97.6 FL (ref 81.4–97.8)
PHOSPHATE SERPL-MCNC: 3 MG/DL (ref 2.5–4.5)
PLATELET # BLD AUTO: 220 K/UL (ref 164–446)
PMV BLD AUTO: 9.9 FL (ref 9–12.9)
POTASSIUM SERPL-SCNC: 5.3 MMOL/L (ref 3.6–5.5)
PROT SERPL-MCNC: 6.3 G/DL (ref 6–8.2)
RBC # BLD AUTO: 3.27 M/UL (ref 4.2–5.4)
SODIUM SERPL-SCNC: 142 MMOL/L (ref 135–145)
TIBC SERPL-MCNC: 265 UG/DL (ref 250–450)
UIBC SERPL-MCNC: 227 UG/DL (ref 110–370)
VIT B12 SERPL-MCNC: 223 PG/ML (ref 211–911)
WBC # BLD AUTO: 18.2 K/UL (ref 4.8–10.8)

## 2021-10-09 PROCEDURE — A9270 NON-COVERED ITEM OR SERVICE: HCPCS | Performed by: INTERNAL MEDICINE

## 2021-10-09 PROCEDURE — 700111 HCHG RX REV CODE 636 W/ 250 OVERRIDE (IP): Performed by: INTERNAL MEDICINE

## 2021-10-09 PROCEDURE — 700101 HCHG RX REV CODE 250: Performed by: INTERNAL MEDICINE

## 2021-10-09 PROCEDURE — 84100 ASSAY OF PHOSPHORUS: CPT

## 2021-10-09 PROCEDURE — 82962 GLUCOSE BLOOD TEST: CPT

## 2021-10-09 PROCEDURE — 82728 ASSAY OF FERRITIN: CPT

## 2021-10-09 PROCEDURE — 80053 COMPREHEN METABOLIC PANEL: CPT

## 2021-10-09 PROCEDURE — 85027 COMPLETE CBC AUTOMATED: CPT

## 2021-10-09 PROCEDURE — 700101 HCHG RX REV CODE 250

## 2021-10-09 PROCEDURE — 700105 HCHG RX REV CODE 258: Performed by: INTERNAL MEDICINE

## 2021-10-09 PROCEDURE — 99233 SBSQ HOSP IP/OBS HIGH 50: CPT | Performed by: INTERNAL MEDICINE

## 2021-10-09 PROCEDURE — 770020 HCHG ROOM/CARE - TELE (206)

## 2021-10-09 PROCEDURE — 83735 ASSAY OF MAGNESIUM: CPT

## 2021-10-09 PROCEDURE — 94640 AIRWAY INHALATION TREATMENT: CPT

## 2021-10-09 PROCEDURE — 99255 IP/OBS CONSLTJ NEW/EST HI 80: CPT | Performed by: HOSPITALIST

## 2021-10-09 PROCEDURE — 83540 ASSAY OF IRON: CPT

## 2021-10-09 PROCEDURE — C9113 INJ PANTOPRAZOLE SODIUM, VIA: HCPCS | Performed by: INTERNAL MEDICINE

## 2021-10-09 PROCEDURE — 82607 VITAMIN B-12: CPT

## 2021-10-09 PROCEDURE — 700102 HCHG RX REV CODE 250 W/ 637 OVERRIDE(OP): Performed by: INTERNAL MEDICINE

## 2021-10-09 PROCEDURE — 94760 N-INVAS EAR/PLS OXIMETRY 1: CPT

## 2021-10-09 PROCEDURE — 83550 IRON BINDING TEST: CPT

## 2021-10-09 RX ORDER — AZITHROMYCIN 500 MG/5ML
500 INJECTION, POWDER, LYOPHILIZED, FOR SOLUTION INTRAVENOUS ONCE
Status: COMPLETED | OUTPATIENT
Start: 2021-10-09 | End: 2021-10-09

## 2021-10-09 RX ORDER — QUETIAPINE FUMARATE 25 MG/1
25 TABLET, FILM COATED ORAL 2 TIMES DAILY
Status: DISCONTINUED | OUTPATIENT
Start: 2021-10-09 | End: 2021-10-10

## 2021-10-09 RX ORDER — MONTELUKAST SODIUM 10 MG/1
10 TABLET ORAL NIGHTLY
Status: DISCONTINUED | OUTPATIENT
Start: 2021-10-09 | End: 2021-10-11 | Stop reason: HOSPADM

## 2021-10-09 RX ORDER — QUETIAPINE FUMARATE 25 MG/1
25 TABLET, FILM COATED ORAL 3 TIMES DAILY PRN
Status: DISCONTINUED | OUTPATIENT
Start: 2021-10-09 | End: 2021-10-09

## 2021-10-09 RX ORDER — METHYLPREDNISOLONE SODIUM SUCCINATE 40 MG/ML
40 INJECTION, POWDER, LYOPHILIZED, FOR SOLUTION INTRAMUSCULAR; INTRAVENOUS DAILY
Status: DISCONTINUED | OUTPATIENT
Start: 2021-10-10 | End: 2021-10-11

## 2021-10-09 RX ORDER — PANTOPRAZOLE SODIUM 40 MG/10ML
40 INJECTION, POWDER, LYOPHILIZED, FOR SOLUTION INTRAVENOUS DAILY
Status: DISCONTINUED | OUTPATIENT
Start: 2021-10-10 | End: 2021-10-10

## 2021-10-09 RX ORDER — BUDESONIDE AND FORMOTEROL FUMARATE DIHYDRATE 160; 4.5 UG/1; UG/1
2 AEROSOL RESPIRATORY (INHALATION)
Status: DISCONTINUED | OUTPATIENT
Start: 2021-10-09 | End: 2021-10-11 | Stop reason: HOSPADM

## 2021-10-09 RX ORDER — IPRATROPIUM BROMIDE AND ALBUTEROL SULFATE 2.5; .5 MG/3ML; MG/3ML
SOLUTION RESPIRATORY (INHALATION)
Status: COMPLETED
Start: 2021-10-09 | End: 2021-10-09

## 2021-10-09 RX ORDER — IPRATROPIUM BROMIDE AND ALBUTEROL SULFATE 2.5; .5 MG/3ML; MG/3ML
3 SOLUTION RESPIRATORY (INHALATION)
Status: DISCONTINUED | OUTPATIENT
Start: 2021-10-09 | End: 2021-10-10

## 2021-10-09 RX ADMIN — IPRATROPIUM BROMIDE AND ALBUTEROL SULFATE 3 ML: .5; 2.5 SOLUTION RESPIRATORY (INHALATION) at 01:49

## 2021-10-09 RX ADMIN — METHYLPREDNISOLONE SODIUM SUCCINATE 62.5 MG: 125 INJECTION, POWDER, FOR SOLUTION INTRAMUSCULAR; INTRAVENOUS at 05:47

## 2021-10-09 RX ADMIN — SODIUM CHLORIDE 3 G: 900 INJECTION INTRAVENOUS at 05:48

## 2021-10-09 RX ADMIN — METHYLPREDNISOLONE SODIUM SUCCINATE 62.5 MG: 125 INJECTION, POWDER, FOR SOLUTION INTRAMUSCULAR; INTRAVENOUS at 00:04

## 2021-10-09 RX ADMIN — IPRATROPIUM BROMIDE AND ALBUTEROL SULFATE 3 ML: .5; 2.5 SOLUTION RESPIRATORY (INHALATION) at 14:34

## 2021-10-09 RX ADMIN — DEXTROSE MONOHYDRATE: 50 INJECTION, SOLUTION INTRAVENOUS at 04:34

## 2021-10-09 RX ADMIN — IPRATROPIUM BROMIDE AND ALBUTEROL SULFATE 3 ML: .5; 2.5 SOLUTION RESPIRATORY (INHALATION) at 22:03

## 2021-10-09 RX ADMIN — HYDROMORPHONE HYDROCHLORIDE 1 MG: 1 INJECTION, SOLUTION INTRAMUSCULAR; INTRAVENOUS; SUBCUTANEOUS at 01:30

## 2021-10-09 RX ADMIN — DEXTROSE MONOHYDRATE: 50 INJECTION, SOLUTION INTRAVENOUS at 00:00

## 2021-10-09 RX ADMIN — SODIUM CHLORIDE 3 G: 900 INJECTION INTRAVENOUS at 17:20

## 2021-10-09 RX ADMIN — QUETIAPINE FUMARATE 25 MG: 25 TABLET ORAL at 17:19

## 2021-10-09 RX ADMIN — HYDROMORPHONE HYDROCHLORIDE 1 MG: 1 INJECTION, SOLUTION INTRAMUSCULAR; INTRAVENOUS; SUBCUTANEOUS at 05:00

## 2021-10-09 RX ADMIN — AZITHROMYCIN MONOHYDRATE 500 MG: 500 INJECTION, POWDER, LYOPHILIZED, FOR SOLUTION INTRAVENOUS at 04:30

## 2021-10-09 RX ADMIN — DEXTROSE MONOHYDRATE 50 ML: 500 INJECTION PARENTERAL at 20:10

## 2021-10-09 RX ADMIN — IPRATROPIUM BROMIDE AND ALBUTEROL SULFATE 3 ML: .5; 2.5 SOLUTION RESPIRATORY (INHALATION) at 10:21

## 2021-10-09 RX ADMIN — SODIUM CHLORIDE 3 G: 900 INJECTION INTRAVENOUS at 00:04

## 2021-10-09 RX ADMIN — SODIUM CHLORIDE 3 G: 900 INJECTION INTRAVENOUS at 12:03

## 2021-10-09 RX ADMIN — PANTOPRAZOLE SODIUM 40 MG: 40 INJECTION, POWDER, LYOPHILIZED, FOR SOLUTION INTRAVENOUS at 05:48

## 2021-10-09 RX ADMIN — ENOXAPARIN SODIUM 40 MG: 40 INJECTION SUBCUTANEOUS at 10:53

## 2021-10-09 RX ADMIN — BUDESONIDE AND FORMOTEROL FUMARATE DIHYDRATE 2 PUFF: 160; 4.5 AEROSOL RESPIRATORY (INHALATION) at 20:00

## 2021-10-09 RX ADMIN — QUETIAPINE FUMARATE 25 MG: 25 TABLET ORAL at 09:49

## 2021-10-09 ASSESSMENT — FIBROSIS 4 INDEX
FIB4 SCORE: 0.64
FIB4 SCORE: 0.77

## 2021-10-09 NOTE — ASSESSMENT & PLAN NOTE
Urine drug screen positive: amphetamines, PCP, oxycodone, opiates, benzos.  Encourage cessation of illicit drugs and educate against inhalation of any particulate matter.

## 2021-10-09 NOTE — PROGRESS NOTES
Monitor Summary    Rhythm: SR  Heart Rate: 60-80s  Ectopy: freq PACs rare PVCs   Measurements:   0.14/0.10/0.40          12 hr chart check

## 2021-10-09 NOTE — CONSULTS
Hospital Medicine Consultation    Date of Service  10/9/2021    Referring Physician  Nicole Phillips M.D.    Consulting Physician  Lei Waters D.O.    Reason for Consultation  Status asthmaticus and transfer out of critical care service.    History of Presenting Illness  Jessica Jimenez is a 26 y.o. female with polysubstance use, asthma who presented 10/4/2021 with status asthmaticus. Per other notes, on the paramedics arrival at her home she was hypoxic with a SpO2:40% with mottling and toxic appearance with respiratory distress. She had her 1st Pfizer COVID vaccine 9/22/21. In the ER she received nebulizers, was on bipap, given IM epi, heliox, solumedrol and magnesium.  She was admitted to the ICU and had worsening of breathing and required intubation 10/5. She was extubated 10/8 to bipap but was agitated and delirious.     10/9: Remains confused and nonresponsive to questions.  Moving arms/legs and looks at me at times.      Review of Systems  Review of Systems   Unable to perform ROS: Mental acuity       Past Medical History  Unable to obtain due to mental status.  Asthma    Surgical History  Unable to obtain due to mentation    Family History  Unknown    Social History   reports that she has never smoked. She has never used smokeless tobacco. She reports that she does not drink alcohol and does not use drugs.     Medications  Prior to Admission Medications   Prescriptions Last Dose Informant Patient Reported? Taking?   ALBUTEROL SULFATE PO 10/4/2021 at 1600 Patient Yes Yes   Sig: Take 3 mL by mouth every four hours as needed (Shortness of breath). Albuterol nebulizer solution   pseudoephedrine-guaifenesin (MUCINEX D)  MG per tablet 10/3/2021 at pm Patient Yes Yes   Sig: Take 1 Tablet by mouth every 12 hours.      Facility-Administered Medications: None       Allergies  No Known Allergies    Physical Exam  Temp:  [35.9 °C (96.6 °F)-37.7 °C (99.9 °F)] 35.9 °C (96.6 °F)  Pulse:  [] 97  Resp:  [14-26]  25  BP: ()/(62-89) 121/84  SpO2:  [91 %-99 %] 95 %    Physical Exam  Vitals reviewed.   Constitutional:       General: She is awake.      Appearance: Normal appearance. She is not diaphoretic.   HENT:      Head: Normocephalic and atraumatic.      Nose: Nose normal.      Mouth/Throat:      Mouth: Mucous membranes are moist.      Pharynx: No oropharyngeal exudate.   Eyes:      General: No scleral icterus.        Right eye: No discharge.         Left eye: No discharge.      Extraocular Movements: Extraocular movements intact.      Conjunctiva/sclera: Conjunctivae normal.   Cardiovascular:      Rate and Rhythm: Normal rate and regular rhythm.      Pulses:           Radial pulses are 2+ on the right side and 2+ on the left side.        Dorsalis pedis pulses are 2+ on the right side and 2+ on the left side.      Heart sounds: No murmur heard.     Pulmonary:      Effort: Pulmonary effort is normal. No respiratory distress.      Breath sounds: Normal breath sounds. No wheezing or rales.   Abdominal:      General: Bowel sounds are normal. There is no distension.      Palpations: Abdomen is soft.      Tenderness: There is no abdominal tenderness.   Musculoskeletal:         General: No swelling or tenderness.      Cervical back: No muscular tenderness.      Right lower leg: No edema.      Left lower leg: No edema.   Lymphadenopathy:      Cervical: No cervical adenopathy.   Skin:     Coloration: Skin is not jaundiced or pale.   Neurological:      Mental Status: She is unresponsive.      Cranial Nerves: No cranial nerve deficit.   Psychiatric:         Attention and Perception: She is inattentive.         Speech: She is noncommunicative.         Behavior: Behavior is uncooperative and slowed.         Cognition and Memory: Cognition is impaired.         Fluids  Date 10/09/21 0700 - 10/10/21 0659   Shift 8333-7887 3115-9666 0515-2352 24 Hour Total   INTAKE   I.V. 113.2   113.2   Shift Total 113.2   113.2   OUTPUT   Urine 220    220   Shift Total 220   220   Weight (kg) 75.5 75.5 75.5 75.5       Laboratory  Recent Labs     10/07/21  0400 10/07/21  0400 10/07/21  1755 10/08/21  0405 10/09/21  0429   WBC 22.0*  --   --  20.4* 18.2*   RBC 3.18*  --   --  3.06* 3.27*   HEMOGLOBIN 9.9*   < > 9.7* 9.4* 10.2*   HEMATOCRIT 31.1*   < > 30.6* 30.3* 31.9*   MCV 97.8  --   --  99.0* 97.6   MCH 31.1  --   --  30.7 31.2   MCHC 31.8*  --   --  31.0* 32.0*   RDW 48.6  --   --  50.8* 47.6   PLATELETCT 240  --   --  231 220   MPV 9.5  --   --  9.5 9.9    < > = values in this interval not displayed.     Recent Labs     10/07/21  0400 10/08/21  0405 10/09/21  0429   SODIUM 151* 149* 142   POTASSIUM 3.9 4.7 5.3   CHLORIDE 122* 116* 107   CO2 22 25 27   GLUCOSE 143* 140* 162*   BUN 19 19 19   CREATININE 0.54 0.58 0.46*   CALCIUM 7.5* 7.9* 8.5              Recent Labs     10/08/21  0405   TRIGLYCERIDE 135        Imaging  CT-HEAD W/O   Final Result    Limited examination.   1.  No acute intracranial abnormality is identified.   2.  Paranasal sinus disease.   3.  Trace fluid in the mastoid air cells on the right.      [id number] VIEW   Final Result      1.  There is a nonobstructive nonspecific bowel gas pattern.      DX-CHEST-PORTABLE (1 VIEW)   Final Result         1.  Hazy left lung base retrocardiac opacity, could represent infiltrate or atelectasis.      DX-CHEST-PORTABLE (1 VIEW)   Final Result         1.  No acute cardiopulmonary disease.      DX-CHEST-LIMITED (1 VIEW)   Final Result      No evidence of pneumothorax following RIGHT neck catheter placement      DX-CHEST-PORTABLE (1 VIEW)   Final Result         1.  No acute cardiopulmonary disease.      DX-CHEST-PORTABLE (1 VIEW)   Final Result         1.  No acute cardiopulmonary disease.   2.  Endotracheal tube terminates near the kenny, recommend withdrawal 2 cm.      DX-CHEST-LIMITED (1 VIEW)   Final Result      No acute cardiopulmonary disease.          Assessment/Plan  * Severe asthma with status  asthmaticus  Assessment & Plan  RT per protocol  S/P Magnesium  PRN NEBS/INH  Steroids IV through 10/14  Mobilize    Delirium  Assessment & Plan  S/p ketamine   Wean off precedex  Avoid oversedation.  Seraquel wean as able  UDS + for benzos, amphetamine, oxycodone, opiates, PCP    Acute respiratory failure with hypoxia and hypercapnia (HCC)  Assessment & Plan  Due to status asthmaticus  Intubated 10/5 (just after midnight of 10/4)  Extubated 10/8  Wean steroids as tolerates  Nebs/Inhalers  RT per protocol  Incentive spirometer as able.  Mobilize  Aspiration precautions    Obstipation  Assessment & Plan  Bowel protocol    Bloody emesis  Assessment & Plan  Stable Hgb and monitoring CBC  Normal INR on admit  On PPI IV  Antiemetics prn.    Polysubstance (including opioids) dependence, daily use (HCC)  Assessment & Plan  Urine drug screen positive: amphetamines, PCP, oxycodone, opiates, benzos.  Encourage cessation of illicit drugs and educate against inhalation of any particulate matter.    Anemia  Assessment & Plan  10/9 Hgb:10.2  Monitor CBC  Check folate, b12, iron studies.    Overweight (BMI 25.0-29.9)  Assessment & Plan  Body mass index is 28.57 kg/m².  Encourage healthy lifestyle modifications/choices.

## 2021-10-09 NOTE — ASSESSMENT & PLAN NOTE
RT per protocol  S/P Magnesium  PRN NEBS/INH  Steroids IV through 10/14  Mobilize  Continue with inhaled steroids and DuoNeb treatments

## 2021-10-09 NOTE — PROGRESS NOTES
Crisis charting: Covid 19 surge in effect.   Bedside report received from dayshift RN. VSS. gtts verified.    Pt able to state first name, unable to other answer questions or follow commands.

## 2021-10-09 NOTE — CARE PLAN
"The patient is Watcher - Medium risk of patient condition declining or worsening    Shift Goals  Clinical Goals: Improve orientation, maintain oxygenation, decrease agitation  Patient Goals: SANDRA  Family Goals: \"get her better\"    Progress made toward(s) clinical / shift goals:    Problem: Fall Risk  Goal: Patient will remain free from falls  Outcome: Progressing     Problem: Psychosocial  Goal: Patient and family will demonstrate ability to cope with life altering diagnosis and/or procedure  Outcome: Progressing     Problem: Communication  Goal: The ability to communicate needs accurately and effectively will improve  Outcome: Progressing     Problem: Respiratory  Goal: Patient will achieve/maintain optimum respiratory ventilation and gas exchange  Outcome: Progressing     Problem: Mechanical Ventilation  Goal: Patient will be able to express needs and understand communication  Outcome: Progressing     Problem: Pain - Standard  Goal: Alleviation of pain or a reduction in pain to the patient’s comfort goal  Outcome: Progressing     Problem: Safety - Medical Restraint  Goal: Remains free of injury from restraints (Restraint for Interference with Medical Device)  Outcome: Progressing       Patient is not progressing towards the following goals:      Problem: Knowledge Deficit - Standard  Goal: Patient and family/care givers will demonstrate understanding of plan of care, disease process/condition, diagnostic tests and medications  Outcome: Not Progressing     Problem: Psychosocial  Goal: Patient's level of anxiety will decrease  Outcome: Not Progressing  Goal: Patient's ability to verbalize feelings about condition will improve  Outcome: Not Progressing  Goal: Patient's ability to re-evaluate and adapt role responsibilities will improve  Outcome: Not Progressing  Goal: Spiritual and cultural needs incorporated into hospitalization  Outcome: Not Progressing     Problem: Bowel Elimination  Goal: Establish and maintain " regular bowel function  Outcome: Not Progressing

## 2021-10-09 NOTE — ASSESSMENT & PLAN NOTE
S/p ketamine   Wean off precedex  Avoid oversedation.  Seraquel wean as able  UDS + for benzos, amphetamine, oxycodone, opiates, PCP

## 2021-10-09 NOTE — ASSESSMENT & PLAN NOTE
Due to status asthmaticus  Intubated 10/5 (just after midnight of 10/4)  Extubated 10/8  Wean steroids as tolerates  Nebs/Inhalers  RT per protocol  Incentive spirometer as able.  Mobilize  Aspiration precautions

## 2021-10-09 NOTE — PROGRESS NOTES
Extubated this morning around 1115 to BiPAP. Patient unable to tolerate the mask and removed it. She was then placed on oxymask and weaned to 5L NC. Patient still following commands but unable to get a verbal response out of her all day. Breath sounds wheezy and she has a productive cough. 1500ml urine output today.

## 2021-10-09 NOTE — CARE PLAN
Problem: Nutritional:  Goal: Achieve adequate nutritional intake  Description: Diet to advance past NPO/Clears and patient will consume >50% of meals.  10/9/2021 1207 by Heather Alcala R.D.  Outcome: Not Met   SLP pending

## 2021-10-09 NOTE — DIETARY
Nutrition Services: Update  Day 5 NPO. Patient has been extubated. Per RN/MD SLP consult is pending prior to diet advancement.     Plan/Recommend:   · Advance diet per SLP.  · If unable to advance diet, consider supplemental nutrition support to meet nutrition needs.   · Nutrition rep to see patient daily for meal and snack preferences once diet advances.     RD monitoring for nutrition POC

## 2021-10-09 NOTE — PROGRESS NOTES
Critical Care Progress Note    Date of admission  10/4/2021    Chief Complaint  26 y.o. female admitted 10/4/2021 with status asthmaticus    Hospital Course  10/4 admitted to ICU  10/4 heliox via HFNC, continuous albuterol, magnesium  10/4 intubated for acute hypoxic resp failure, ketamine gtt, propofol gtt    10/7: Not waking up from sedation, CTH OK. High level autoPEEP resolved with vent adjustment  10/8: extubated to bipap. Agitated delirium on precedex      Interval Problem Update  Reviewed last 24 hour events:  Neuro: yelling, agitated. No answering questions but follows. RASS 0-+3  HR: SR-ST 90-110s  SBP: 110s-130s  Tmax: AF  GI: SLP eval today.  Mag citrate once SLP has seen her vs enteral access  I/O: voiding  Lines: d/c all but PIV  Mobility: chair, EOB  Resp: 2L   Vte: lovenox, resume  PPI/H2: daily PPI  Antibx: unasyn    K 5.3  Lower steroid dose, switch to PO if passes SLP    Review of Systems  Review of Systems   Unable to perform ROS: Intubated        Vital Signs for last 24 hours   Temp:  [37 °C (98.6 °F)-37.7 °C (99.9 °F)] 37.2 °C (99 °F)  Pulse:  [] 59  Resp:  [13-28] 14  BP: ()/(63-85) 110/67  SpO2:  [90 %-99 %] 98 %    Hemodynamic parameters for last 24 hours       Respiratory Information for the last 24 hours  Vent Mode: Spont  PEEP/CPAP: 5  P Support: 5    Physical Exam   Physical Exam  Vitals and nursing note reviewed.   Constitutional:       General: She is not in acute distress.     Appearance: She is ill-appearing. She is not toxic-appearing or diaphoretic.      Comments: Standing EOB with RN for mobility   HENT:      Head: Normocephalic.      Mouth/Throat:      Mouth: Mucous membranes are dry.   Eyes:      Pupils: Pupils are equal, round, and reactive to light.   Cardiovascular:      Rate and Rhythm: Normal rate and regular rhythm.      Pulses: Normal pulses.      Heart sounds: Normal heart sounds. No murmur heard.     Pulmonary:      Effort: No respiratory distress.       "Breath sounds: Wheezing present. No rhonchi.      Comments: Decent air movement  Ongoing wheeze, but much improved from prior  Abdominal:      General: There is no distension.      Palpations: Abdomen is soft.      Tenderness: There is no abdominal tenderness. There is no guarding.      Comments: Hypoactive bowel sounds   Musculoskeletal:      Right lower leg: No edema.      Left lower leg: No edema.   Skin:     General: Skin is warm and dry.   Neurological:      Comments: Confused, perseverating on \"hi\". Not answering questions, but following commands         Medications  Current Facility-Administered Medications   Medication Dose Route Frequency Provider Last Rate Last Admin   • albuterol (PROVENTIL) 2.5mg/0.5ml nebulizer solution 2.5 mg  2.5 mg Nebulization Q2HRS PRN Nicole Phillips M.D.   2.5 mg at 10/08/21 1344   • dexmedetomidine (PRECEDEX) 400 mcg/100mL NS premix infusion  0.1-1.5 mcg/kg/hr Intravenous Continuous Nicole Phillips M.D. 6.6 mL/hr at 10/09/21 0300 0.4 mcg/kg/hr at 10/09/21 0300   • ipratropium-albuterol (DUONEB) nebulizer solution  3 mL Nebulization Q4HRS (RT) Nicole Phillips M.D.   3 mL at 10/09/21 0149   • magnesium citrate solution 296 mL  296 mL Enteral Tube Once Nicole Phillips M.D.       • dextrose 5% infusion   Intravenous Continuous Nicole Phillips M.D. 50 mL/hr at 10/09/21 0434 New Bag at 10/09/21 0434   • HYDROmorphone (DILAUDID) 1 mg/mL in 50mL NS (HIGH ALERT - Non-Standard Continuous Infusion Concentration)   Intravenous Continuous Nicole Phillips M.D.   Stopped at 10/08/21 0800   • pantoprazole (PROTONIX) injection 40 mg  40 mg Intravenous BID Nicole Phillips M.D.   40 mg at 10/09/21 0548   • ampicillin/sulbactam (UNASYN) 3 g in  mL IVPB  3 g Intravenous Q6HRS Henrry Russell D.OSharron   Stopped at 10/09/21 0618   • glucose 4 g chewable tablet 16 g  16 g Oral Q15 MIN PRN Henrry Russell D.O.        And   • dextrose 50% (D50W) injection 50 mL  50 mL Intravenous Q15 MIN PRN Henrry Russell D.O.   "     • HYDROmorphone (Dilaudid) injection 1-2 mg  1-2 mg Intravenous Q HOUR PRN ANASTACIO CheekOSharron   1 mg at 10/09/21 0500   • labetalol (NORMODYNE/TRANDATE) injection 10 mg  10 mg Intravenous Q2HRS PRN Henrry Russell D.O.       • methylPREDNISolone sod succ (SOLU-MEDROL) 125 MG injection 62.5 mg  62.5 mg Intravenous Q6HRS Robby Ham M.D.   62.5 mg at 10/09/21 0547   • senna-docusate (PERICOLACE or SENOKOT S) 8.6-50 MG per tablet 2 Tablet  2 Tablet Oral BID Robby Ham M.D.   2 Tablet at 10/08/21 0542    And   • polyethylene glycol/lytes (MIRALAX) PACKET 1 Packet  1 Packet Oral QDAY PRN Robby Ham M.D.   1 Packet at 10/07/21 1730    And   • magnesium hydroxide (MILK OF MAGNESIA) suspension 30 mL  30 mL Oral QDAY PRN Robby Ham M.D.        And   • bisacodyl (DULCOLAX) suppository 10 mg  10 mg Rectal QDAY PRN Robby Ham M.D.       • acetaminophen (Tylenol) tablet 650 mg  650 mg Oral Q6HRS PRN Robby Ham M.D.   650 mg at 10/06/21 0533   • ondansetron (ZOFRAN) syringe/vial injection 4 mg  4 mg Intravenous Q4HRS PRN Robby Ham M.D.   4 mg at 10/07/21 2119   • ondansetron (ZOFRAN ODT) dispertab 4 mg  4 mg Oral Q4HRS PRN Robby Ham M.D.       • promethazine (PHENERGAN) tablet 12.5-25 mg  12.5-25 mg Oral Q4HRS PRN Robby Ham M.D.       • promethazine (PHENERGAN) suppository 12.5-25 mg  12.5-25 mg Rectal Q4HRS PRN Robby Ham M.D.       • prochlorperazine (COMPAZINE) injection 5-10 mg  5-10 mg Intravenous Q4HRS PRN Robby Ham M.D.       • guaiFENesin dextromethorphan (ROBITUSSIN DM) 100-10 MG/5ML syrup 10 mL  10 mL Oral Q6HRS PRN Robby Ham M.D.       • albuterol inhaler 2 Puff  2 Puff Inhalation Q2HRS PRN Robby Ham M.D.   2 Puff at 10/08/21 1945   • Respiratory Therapy Consult   Nebulization Continuous RT Robby Ham M.D.       • MD Alert...ICU Electrolyte Replacement per Pharmacy   Other PHARMACY TO DOSE Robby Ham M.D.       •  lidocaine (XYLOCAINE) 1 % injection 2 mL  2 mL Tracheal Tube Q30 MIN PRN Robby Ham M.D.           Fluids    Intake/Output Summary (Last 24 hours) at 10/9/2021 0644  Last data filed at 10/9/2021 0600  Gross per 24 hour   Intake 1622.8 ml   Output 3055 ml   Net -1432.2 ml       Laboratory  Recent Labs     10/07/21  0902 10/07/21  1125 10/07/21  1737   ISTATAPH 7.287* 7.270* 7.368*   ISTATAPCO2 47.8* 52.0* 41.9*   ISTATAPO2 89* 109* 115*   ISTATATCO2 24 25 25   CTXFVXO1BJM 96 97 98   ISTATARTHCO3 22.8 23.9 24.1   ISTATARTBE -4 -3 -1   ISTATTEMP 37.0 C 38.0 C 38.0 C   ISTATFIO2 40 40 40   ISTATSPEC Arterial Arterial Arterial   ISTATAPHTC 7.287* 7.256* 7.353*   SAFEASOF7YY 89* 116* 121*         Recent Labs     10/07/21  0400 10/08/21  0405 10/09/21  0429   SODIUM 151* 149* 142   POTASSIUM 3.9 4.7 5.3   CHLORIDE 122* 116* 107   CO2 22 25 27   BUN 19 19 19   CREATININE 0.54 0.58 0.46*   MAGNESIUM 2.8* 2.7* 2.5   PHOSPHORUS 1.7* 2.5 3.0   CALCIUM 7.5* 7.9* 8.5     Recent Labs     10/07/21  0400 10/08/21  0405 10/09/21  0429   ALTSGPT 8 18 25   ASTSGOT 15 29 27   ALKPHOSPHAT 49 55 57   TBILIRUBIN <0.2 0.2 0.2   GLUCOSE 143* 140* 162*     Recent Labs     10/07/21  0400 10/08/21  0405 10/09/21  0429   WBC 22.0* 20.4* 18.2*   ASTSGOT 15 29 27   ALTSGPT 8 18 25   ALKPHOSPHAT 49 55 57   TBILIRUBIN <0.2 0.2 0.2     Recent Labs     10/07/21  0400 10/07/21  0400 10/07/21  1755 10/08/21  0405 10/09/21  0429   RBC 3.18*  --   --  3.06* 3.27*   HEMOGLOBIN 9.9*   < > 9.7* 9.4* 10.2*   HEMATOCRIT 31.1*   < > 30.6* 30.3* 31.9*   PLATELETCT 240  --   --  231 220    < > = values in this interval not displayed.       Imaging  X-Ray:  I have personally reviewed the images and compared with prior images.    Assessment/Plan  * Severe asthma with status asthmaticus  Assessment & Plan  Hypoxic at 40% upon arrival of EMS. CXR unremarkable  After multiple rounds of ketamine IVP, continuous albuterol/heliox, steroids, magnesium, the patient  became hypercapnic, more hypoxic and required mechanical ventilation and paralyzed  Extubated 10/8 to bipap  Now on low flow O2  Unknown what her home asthma regimen, if any, has been    Plan:   -q4h nebs  -methylpred 40 daily, can switch to prednisone when cleared by speech  -When able to participate with inhalers, need high dose ICS/LABA and anti-cholinergic.  Recommend a de-escalation strategy with her rather than an escalation strategy given the severity of her asthma exacerbation  -I added singulair  - once mental status has cleared, needs counseling that she can never ever use inhaled drugs    Obstipation  Assessment & Plan  Mag citrate once cleared by speech or enteral access is obtained    Delirium  Assessment & Plan  Medication induced from sedatives and ICU delirium.  Possibly some component of withdrawal given her polysubstance use  D/c precedex  seroquel 25 BID  Sitter  Avoid delirium inducing meds  Remove all lines/tubes      Bloody emesis  Assessment & Plan  Small volume bright red blood in NG output  No melena or hematochezia  Hgb stable from yesterday  Back off to daily PPI (has been on high dose steroids)  Resume lovenox  Monitor for additional e/o bleed.  I suspect this was epistaxis from thrashing with cor-track in place    Polysubstance (including opioids) dependence, daily use (HCC)  Assessment & Plan  Counseling when appropriate    Acute respiratory failure with hypoxia and hypercapnia (HCC)  Assessment & Plan  Secondary to status asthmaticus  Covering CAP  Extubated to bipap 10/8  Low flow O2 10/9       VTE:  Lovenox  Ulcer: PPI  Lines: None    I have performed a physical exam and reviewed and updated ROS and Plan today (10/9/2021). In review of yesterday's note (10/8/2021), there are no changes except as documented above.     Discussed patient condition and risk of morbidity and/or mortality with RN, RT, Pharmacy and Charge nurse / hot rounds

## 2021-10-09 NOTE — ASSESSMENT & PLAN NOTE
Stable Hgb and monitoring CBC  Normal INR on admit  Transition to oral omeprazole  Antiemetics prn.

## 2021-10-10 LAB
ALBUMIN SERPL BCP-MCNC: 3.7 G/DL (ref 3.2–4.9)
ALBUMIN/GLOB SERPL: 0.9 G/DL
ALP SERPL-CCNC: 66 U/L (ref 30–99)
ALT SERPL-CCNC: 38 U/L (ref 2–50)
ANION GAP SERPL CALC-SCNC: 12 MMOL/L (ref 7–16)
AST SERPL-CCNC: 37 U/L (ref 12–45)
BILIRUB SERPL-MCNC: 0.6 MG/DL (ref 0.1–1.5)
BUN SERPL-MCNC: 14 MG/DL (ref 8–22)
CALCIUM SERPL-MCNC: 9.4 MG/DL (ref 8.5–10.5)
CHLORIDE SERPL-SCNC: 99 MMOL/L (ref 96–112)
CO2 SERPL-SCNC: 28 MMOL/L (ref 20–33)
CREAT SERPL-MCNC: 0.52 MG/DL (ref 0.5–1.4)
ERYTHROCYTE [DISTWIDTH] IN BLOOD BY AUTOMATED COUNT: 43 FL (ref 35.9–50)
GLOBULIN SER CALC-MCNC: 4 G/DL (ref 1.9–3.5)
GLUCOSE BLD-MCNC: 82 MG/DL (ref 65–99)
GLUCOSE SERPL-MCNC: 97 MG/DL (ref 65–99)
HCT VFR BLD AUTO: 40.9 % (ref 37–47)
HGB BLD-MCNC: 13.5 G/DL (ref 12–16)
MAGNESIUM SERPL-MCNC: 2.1 MG/DL (ref 1.5–2.5)
MCH RBC QN AUTO: 31.2 PG (ref 27–33)
MCHC RBC AUTO-ENTMCNC: 33 G/DL (ref 33.6–35)
MCV RBC AUTO: 94.5 FL (ref 81.4–97.8)
PHOSPHATE SERPL-MCNC: 1.9 MG/DL (ref 2.5–4.5)
PLATELET # BLD AUTO: 305 K/UL (ref 164–446)
PMV BLD AUTO: 9.4 FL (ref 9–12.9)
POTASSIUM SERPL-SCNC: 3.6 MMOL/L (ref 3.6–5.5)
PROT SERPL-MCNC: 7.7 G/DL (ref 6–8.2)
RBC # BLD AUTO: 4.33 M/UL (ref 4.2–5.4)
SODIUM SERPL-SCNC: 139 MMOL/L (ref 135–145)
WBC # BLD AUTO: 17.4 K/UL (ref 4.8–10.8)

## 2021-10-10 PROCEDURE — 700101 HCHG RX REV CODE 250: Performed by: INTERNAL MEDICINE

## 2021-10-10 PROCEDURE — 92610 EVALUATE SWALLOWING FUNCTION: CPT

## 2021-10-10 PROCEDURE — 94640 AIRWAY INHALATION TREATMENT: CPT

## 2021-10-10 PROCEDURE — 36415 COLL VENOUS BLD VENIPUNCTURE: CPT

## 2021-10-10 PROCEDURE — 700111 HCHG RX REV CODE 636 W/ 250 OVERRIDE (IP): Performed by: INTERNAL MEDICINE

## 2021-10-10 PROCEDURE — 94760 N-INVAS EAR/PLS OXIMETRY 1: CPT

## 2021-10-10 PROCEDURE — 700102 HCHG RX REV CODE 250 W/ 637 OVERRIDE(OP): Performed by: INTERNAL MEDICINE

## 2021-10-10 PROCEDURE — 700101 HCHG RX REV CODE 250: Performed by: HOSPITALIST

## 2021-10-10 PROCEDURE — 770020 HCHG ROOM/CARE - TELE (206)

## 2021-10-10 PROCEDURE — 80053 COMPREHEN METABOLIC PANEL: CPT

## 2021-10-10 PROCEDURE — 700105 HCHG RX REV CODE 258: Performed by: INTERNAL MEDICINE

## 2021-10-10 PROCEDURE — 82962 GLUCOSE BLOOD TEST: CPT

## 2021-10-10 PROCEDURE — 83735 ASSAY OF MAGNESIUM: CPT

## 2021-10-10 PROCEDURE — 85027 COMPLETE CBC AUTOMATED: CPT

## 2021-10-10 PROCEDURE — 99232 SBSQ HOSP IP/OBS MODERATE 35: CPT | Performed by: HOSPITALIST

## 2021-10-10 PROCEDURE — A9270 NON-COVERED ITEM OR SERVICE: HCPCS | Performed by: INTERNAL MEDICINE

## 2021-10-10 PROCEDURE — C9113 INJ PANTOPRAZOLE SODIUM, VIA: HCPCS | Performed by: INTERNAL MEDICINE

## 2021-10-10 PROCEDURE — 700111 HCHG RX REV CODE 636 W/ 250 OVERRIDE (IP): Performed by: STUDENT IN AN ORGANIZED HEALTH CARE EDUCATION/TRAINING PROGRAM

## 2021-10-10 PROCEDURE — 700105 HCHG RX REV CODE 258: Performed by: HOSPITALIST

## 2021-10-10 PROCEDURE — 84100 ASSAY OF PHOSPHORUS: CPT

## 2021-10-10 PROCEDURE — 99232 SBSQ HOSP IP/OBS MODERATE 35: CPT | Mod: GC | Performed by: INTERNAL MEDICINE

## 2021-10-10 RX ORDER — LORAZEPAM 2 MG/ML
1 INJECTION INTRAMUSCULAR
Status: DISCONTINUED | OUTPATIENT
Start: 2021-10-10 | End: 2021-10-10

## 2021-10-10 RX ORDER — LORAZEPAM 2 MG/ML
2 INJECTION INTRAMUSCULAR
Status: DISCONTINUED | OUTPATIENT
Start: 2021-10-10 | End: 2021-10-10

## 2021-10-10 RX ORDER — OMEPRAZOLE 20 MG/1
20 CAPSULE, DELAYED RELEASE ORAL DAILY
Status: DISCONTINUED | OUTPATIENT
Start: 2021-10-11 | End: 2021-10-11 | Stop reason: HOSPADM

## 2021-10-10 RX ORDER — IPRATROPIUM BROMIDE AND ALBUTEROL SULFATE 2.5; .5 MG/3ML; MG/3ML
3 SOLUTION RESPIRATORY (INHALATION)
Status: DISCONTINUED | OUTPATIENT
Start: 2021-10-10 | End: 2021-10-11 | Stop reason: HOSPADM

## 2021-10-10 RX ADMIN — IPRATROPIUM BROMIDE AND ALBUTEROL SULFATE 3 ML: .5; 2.5 SOLUTION RESPIRATORY (INHALATION) at 14:06

## 2021-10-10 RX ADMIN — IPRATROPIUM BROMIDE AND ALBUTEROL SULFATE 3 ML: .5; 2.5 SOLUTION RESPIRATORY (INHALATION) at 11:57

## 2021-10-10 RX ADMIN — MONTELUKAST 10 MG: 10 TABLET, FILM COATED ORAL at 20:33

## 2021-10-10 RX ADMIN — LORAZEPAM 2 MG: 2 INJECTION INTRAMUSCULAR; INTRAVENOUS at 03:41

## 2021-10-10 RX ADMIN — BUDESONIDE AND FORMOTEROL FUMARATE DIHYDRATE 2 PUFF: 160; 4.5 AEROSOL RESPIRATORY (INHALATION) at 20:18

## 2021-10-10 RX ADMIN — SODIUM CHLORIDE 3 G: 900 INJECTION INTRAVENOUS at 00:00

## 2021-10-10 RX ADMIN — BUDESONIDE AND FORMOTEROL FUMARATE DIHYDRATE 2 PUFF: 160; 4.5 AEROSOL RESPIRATORY (INHALATION) at 07:23

## 2021-10-10 RX ADMIN — SODIUM CHLORIDE 3 G: 900 INJECTION INTRAVENOUS at 17:22

## 2021-10-10 RX ADMIN — METHYLPREDNISOLONE SODIUM SUCCINATE 40 MG: 40 INJECTION, POWDER, FOR SOLUTION INTRAMUSCULAR; INTRAVENOUS at 05:20

## 2021-10-10 RX ADMIN — POTASSIUM PHOSPHATE, MONOBASIC AND POTASSIUM PHOSPHATE, DIBASIC 30 MMOL: 224; 236 INJECTION, SOLUTION, CONCENTRATE INTRAVENOUS at 14:10

## 2021-10-10 RX ADMIN — ENOXAPARIN SODIUM 40 MG: 40 INJECTION SUBCUTANEOUS at 05:20

## 2021-10-10 RX ADMIN — IPRATROPIUM BROMIDE AND ALBUTEROL SULFATE 3 ML: .5; 2.5 SOLUTION RESPIRATORY (INHALATION) at 20:18

## 2021-10-10 RX ADMIN — SODIUM CHLORIDE 3 G: 900 INJECTION INTRAVENOUS at 11:53

## 2021-10-10 RX ADMIN — PANTOPRAZOLE SODIUM 40 MG: 40 INJECTION, POWDER, LYOPHILIZED, FOR SOLUTION INTRAVENOUS at 05:19

## 2021-10-10 RX ADMIN — IPRATROPIUM BROMIDE AND ALBUTEROL SULFATE 3 ML: .5; 2.5 SOLUTION RESPIRATORY (INHALATION) at 07:23

## 2021-10-10 RX ADMIN — SODIUM CHLORIDE 3 G: 900 INJECTION INTRAVENOUS at 05:20

## 2021-10-10 ASSESSMENT — COGNITIVE AND FUNCTIONAL STATUS - GENERAL
SUGGESTED CMS G CODE MODIFIER DAILY ACTIVITY: CH
DAILY ACTIVITIY SCORE: 24
MOBILITY SCORE: 24
SUGGESTED CMS G CODE MODIFIER MOBILITY: CH

## 2021-10-10 NOTE — PROGRESS NOTES
Patient transferred to [id number]. Report called and belongings sent with patient. VSS at time of transport.

## 2021-10-10 NOTE — PROGRESS NOTES
"Assumed care of patient. Bedside report, received from Gini BOBBY. Updated POC, call light within reach, and fall precautions in place. Bed locked and and in lowest position. Patient instructed to call for assistance before getting out of bed. All questions answered, no further needs at this time.      **Pt is a little more alert today. Stable on RA. Still fatigued and delirious. Able to get in touch with her brother Lei \"DJ\" who is out of town, but will come see her tomorrow. Her boyfriend Polo Rhodes, will be coming to the hospital today and is aware of her admission.**  "

## 2021-10-10 NOTE — PROGRESS NOTES
[facility] Medicine Daily Progress Note    Date of Service  10/10/2021    Chief Complaint  Jessica Jimenez is a 26 y.o. female admitted 10/4/2021 with history of asthma, polysubstance abuse who presents to the hospital for shortness of breath due to status asthmaticus    Hospital Course  Patient presented to the hospital with O2 saturation of 40%.  In the ER she was given albuterol, magnesium, epinephrine, BiPAP and then transition to high flow oxygen.  She was given multiple rounds of IV ketamine.  Eventually her hypoxic and hypercapnic respiratory failure continued to worsen and she was subsequently intubated.  She was started on IV steroids and ketamine infusion in ICU.  Patient was extubated on 10/8 and transition to BiPAP.  She was found to be agitated and delirious and started on was 3 L.    Interval Problem Update  10/10: Patient was found to be lethargic examination.  I would discontinue her sedative medications.  PT OT is pending.    I have personally seen and examined the patient at bedside. I discussed the plan of care with patient.    Consultants/Specialty  pulmonary    Code Status  Full Code    Disposition  Patient is not medically cleared.   Anticipate discharge to to home with close outpatient follow-up.  I have placed the appropriate orders for post-discharge needs.    Review of Systems  Review of Systems   Unable to perform ROS: Acuity of condition        Physical Exam  Temp:  [35.9 °C (96.6 °F)-36.9 °C (98.5 °F)] 36.8 °C (98.3 °F)  Pulse:  [] 74  Resp:  [14-25] 18  BP: (105-143)/(67-89) 105/67  SpO2:  [83 %-99 %] 98 %    Physical Exam  Vitals and nursing note reviewed.   Constitutional:       General: She is not in acute distress.     Appearance: Normal appearance. She is not ill-appearing, toxic-appearing or diaphoretic.   HENT:      Head: Normocephalic and atraumatic.      Nose: No congestion or rhinorrhea.      Mouth/Throat:      Pharynx: No oropharyngeal exudate or posterior oropharyngeal  erythema.   Eyes:      General: No scleral icterus.  Neck:      Vascular: No carotid bruit.   Cardiovascular:      Rate and Rhythm: Normal rate and regular rhythm.      Pulses: Normal pulses.      Heart sounds: Normal heart sounds. No murmur heard.   No friction rub. No gallop.    Pulmonary:      Effort: Pulmonary effort is normal. No respiratory distress.      Breath sounds: Normal breath sounds. No stridor. No wheezing or rhonchi.   Abdominal:      General: Abdomen is flat. There is no distension.      Palpations: There is no mass.      Tenderness: There is no abdominal tenderness. There is no left CVA tenderness, guarding or rebound.      Hernia: No hernia is present.   Musculoskeletal:         General: No swelling. Normal range of motion.      Cervical back: No rigidity. No muscular tenderness.      Right lower leg: No edema.      Left lower leg: No edema.   Lymphadenopathy:      Cervical: No cervical adenopathy.   Skin:     General: Skin is warm and dry.      Capillary Refill: Capillary refill takes more than 3 seconds.      Coloration: Skin is not jaundiced or pale.      Findings: No bruising or erythema.   Neurological:      Mental Status: She is alert.         Fluids    Intake/Output Summary (Last 24 hours) at 10/10/2021 1154  Last data filed at 10/9/2021 1200  Gross per 24 hour   Intake 55 ml   Output --   Net 55 ml       Laboratory  Recent Labs     10/08/21  0405 10/09/21  0429 10/10/21  0402   WBC 20.4* 18.2* 17.4*   RBC 3.06* 3.27* 4.33   HEMOGLOBIN 9.4* 10.2* 13.5   HEMATOCRIT 30.3* 31.9* 40.9   MCV 99.0* 97.6 94.5   MCH 30.7 31.2 31.2   MCHC 31.0* 32.0* 33.0*   RDW 50.8* 47.6 43.0   PLATELETCT 231 220 305   MPV 9.5 9.9 9.4     Recent Labs     10/08/21  0405 10/09/21  0429 10/10/21  0402   SODIUM 149* 142 139   POTASSIUM 4.7 5.3 3.6   CHLORIDE 116* 107 99   CO2 25 27 28   GLUCOSE 140* 162* 97   BUN 19 19 14   CREATININE 0.58 0.46* 0.52   CALCIUM 7.9* 8.5 9.4             Recent Labs     10/08/21  0405    TRIGLYCERIDE 135       Imaging  CT-HEAD W/O   Final Result    Limited examination.   1.  No acute intracranial abnormality is identified.   2.  Paranasal sinus disease.   3.  Trace fluid in the mastoid air cells on the right.      [id number] VIEW   Final Result      1.  There is a nonobstructive nonspecific bowel gas pattern.      DX-CHEST-PORTABLE (1 VIEW)   Final Result         1.  Hazy left lung base retrocardiac opacity, could represent infiltrate or atelectasis.      DX-CHEST-PORTABLE (1 VIEW)   Final Result         1.  No acute cardiopulmonary disease.      DX-CHEST-LIMITED (1 VIEW)   Final Result      No evidence of pneumothorax following RIGHT neck catheter placement      DX-CHEST-PORTABLE (1 VIEW)   Final Result         1.  No acute cardiopulmonary disease.      DX-CHEST-PORTABLE (1 VIEW)   Final Result         1.  No acute cardiopulmonary disease.   2.  Endotracheal tube terminates near the kenny, recommend withdrawal 2 cm.      DX-CHEST-LIMITED (1 VIEW)   Final Result      No acute cardiopulmonary disease.           Assessment/Plan  * Severe asthma with status asthmaticus  Assessment & Plan  RT per protocol  S/P Magnesium  PRN NEBS/INH  Steroids IV through 10/14  Mobilize  Continue with inhaled steroids and DuoNeb treatments    Anemia  Assessment & Plan  10/9 Hgb:10.2  Monitor CBC  Check folate, b12, iron studies.    Overweight (BMI 25.0-29.9)  Assessment & Plan  Body mass index is 28.57 kg/m².  Encourage healthy lifestyle modifications/choices.    Obstipation  Assessment & Plan  Bowel protocol    Delirium  Assessment & Plan  S/p ketamine   Wean off precedex  Avoid oversedation.  Seraquel wean as able  UDS + for benzos, amphetamine, oxycodone, opiates, PCP    Bloody emesis  Assessment & Plan  Stable Hgb and monitoring CBC  Normal INR on admit  Transition to oral omeprazole  Antiemetics prn.    Polysubstance (including opioids) dependence, daily use (HCC)  Assessment & Plan  Urine drug screen  positive: amphetamines, PCP, oxycodone, opiates, benzos.  Encourage cessation of illicit drugs and educate against inhalation of any particulate matter.    Acute respiratory failure with hypoxia and hypercapnia (HCC)  Assessment & Plan  Due to status asthmaticus  Intubated 10/5 (just after midnight of 10/4)  Extubated 10/8  Wean steroids as tolerates  Nebs/Inhalers  RT per protocol  Incentive spirometer as able.  Mobilize  Aspiration precautions       VTE prophylaxis: SCDs/TEDs    I have performed a physical exam and reviewed and updated ROS and Plan today (10/10/2021). In review of yesterday's note (10/9/2021), there are no changes except as documented above.

## 2021-10-10 NOTE — PROGRESS NOTES
4 Eyes Skin Assessment Completed by KANU Rubalcava and KANU Sogn.    Head WDL  Ears Redness and Blanching  Nose WDL  Mouth WDL  Neck WDL  Breast/Chest WDL  Shoulder Blades WDL  Spine WDL  (R) Arm/Elbow/Hand Antecubital Bruising, elbow pink and blanching  (L) Arm/Elbow/Hand WDL, elbow pink and blanching  Abdomen WDL  Groin WDL  Scrotum/Coccyx/Buttocks Redness and Blanching  (R) Leg WDL  (L) Leg WDL  (R) Heel/Foot/Toe Redness blanching  (L) Heel/Foot/Toe Redness and Blanching          Devices In Places Tele Box, Pulse Ox and Nasal Cannula      Interventions In Place Gray Ear Foams, NC W/Ear Foams, Pillows, Barrier Cream and Pressure Redistribution Mattress    Possible Skin Injury No    Pictures Uploaded Into Epic N/A  Wound Consult Placed N/A  RN Wound Prevention Protocol Ordered No

## 2021-10-10 NOTE — PROGRESS NOTES
Report received from KANU Faust.  Patient is does not respond verbally. Unable to assess orientation status. She opens her eyes to voice.  Vital signs stable.  No non-verbal indicators of pain present. SR/ST on the monitor and on room air.  Bed alarm is on. Safety measures in place and call light in reach.

## 2021-10-10 NOTE — THERAPY
"Speech Language Pathology   Clinical Swallow Evaluation     Patient Name: Jessica Jimenez  AGE:  26 y.o., SEX:  female  Medical Record #: [id number]  Today's Date: 10/10/2021     Precautions  Precautions: Fall Risk, Swallow Precautions ( See Comments)    Assessment    Patient is a 26 y.o. female who presented 10/4/21 with status asthmaticus, found by EMS with SpO2 40%, mottled and toxic appearing on their arrival. PMHx includes polysubstance abuse (UDS positive for amphetamines, PCP, oxycodone, opiates, benzos.) Pt was intubated from 10/4-10/8 and was extubated to BiPAP. Pt has been confused. No history of SLP found in this EMR. Additional factors influencing patient status/progress: AMS.      CT Head, 10/7:    Limited examination.  1.  No acute intracranial abnormality is identified.  2.  Paranasal sinus disease.  3.  Trace fluid in the mastoid air cells on the right.    CXR, 10/7:  1.  Hazy left lung base retrocardiac opacity, could represent infiltrate or atelectasis.    Clinical Swallow Evaluation completed. Pt alert, though intermittently non-responsive. Pt followed ~60% simple, one-step directives to participate in oral motor exam. Generalized weakness noted with lingual movements. Vocal quality was low-volume and often whispered, though pt was able to achieve voicing with cues to say \"ahh.\" Pt was assessed with clinician-delivered PO trials of ice chips x 4, thin liquids x 6 oz by teaspoon and cup sip, mildly thick liquids x 1 oz by cup, applesauce x 3.5 oz, crushed cracker in applesauce x 3 bites, cracker dipped in applesauce x 1 bite. Attempted to have patient self-feed drinking from cup, which she did with hand-over-hand assistance. Oral phase marked by adequate bolus acceptance with impaired rotational chew characterized by vertical bite pattern only, and reduced bolus manipulation. Mild oral stasis of cracker noted on lingual surface. Swallow initiation appeared fairly timely, though presumed weak to " palpation. Poor coordination of respiration/deglutition noted across trials, with subjective increase in work of breathing observed following the swallow. No clinical s/sx aspiration across trials.     Pt is an increased aspiration risk due to altered mental status and oropharyngeal dysphagia, though anticipate swallowing will improve as mentation clears. Recommend pureed solids and thin liquids by cup with medications crushed in puree. Pt will require 1:1 feeding assistance, slow rate, small bites/sips. Take breaks for shortness of breath. Please hold PO for s/sx aspiration or change in respiratory status.     Recommendations discussed with RN and posted at [facility] [place]. SLP will follow.    Plan    Recommend Speech Therapy 3 times per week until therapy goals are met for the following treatments:  Dysphagia Training and Patient / Family / Caregiver Education.    Discharge Recommendations:  (TBD pending clinical course)       Objective       10/10/21 0930   Vitals   O2 (LPM) 0   O2 Delivery Device None - Room Air   Prior Level Of Function   Communication Unknown  (presumed WFL)   Swallow Unknown  (presumed WFL)   Dentition Intact   Dentures None   Hearing Within Functional Limits for Evaluation   Hearing Aid None   Vision Within Functional Limits for Evaluation   Patient's Primary Language English   Comments difficult to determine due to mentation   Oral Motor Eval    Is Patient Able to Complete Oral Motor Eval Yes but Impaired   Labial Function   Labial Structure At Rest Within Functional Limits   Labial Sequence / I /, / U / Within Functional Limits   Lingual Function   Lingual Structure At Rest Within Functional Limits   Lingual Protrude Within Functional Limits   Lateralization Minimal Right;Minimal Left  (reduced )   Lick Lips (Circular)   (unable to complete)   Jaw   Jaw Structure At Rest Within Functional Limits   Bite ([place]) Minimal   Chew (Rotary) Minimal  (no rotational chew)   Velar Function   Velar Structure  "At Rest Within Functional Limits   Laryngeal Function   Voice Quality Minimal  (aphonic at times, low volume)   Volutional Cough Minimal  (weak)   Excursion Upon Swallow Weak ;Complete   Oral Food Presentation   Ice Chips Within Functional Limits   Single Swallow Mildly Thick (2) - (Nectar Thick)  Within Functional Limits   Serial Swallow Mildly Thick (2) - (Nectar Thick) Within Functional Limits   Single Swallow Thin (0) Within Functional Limits   Serial Swallow Thin (0) Minimal   Pureed (4) Within Functional Limits   Minced & Moist (5) - (Dysphagia II) Minimal   Soft & Bite-Sized (6) - (Dysphagia III) Minimal   Self Feeding Needs Total Assistance   Tracheostomy   Tracheostomy  No   Dysphagia Strategies / Recommendations   Strategies / Interventions Recommended (Yes / No) Yes   Compensatory Strategies Strict 1:1 Feeding;Head of Bed 90 Degrees During Eating / Drinking;No Straws;Multiple Swallows;Single Sips / Bites;Alternate Solids / Liquids  (take breaks for shortness of breath, slow, careful feeding)   Diet / Liquid Recommendation Thin (0);Puree (4)   Medication Administration  Crush all Medications in Puree   Therapy Interventions Dysphagia Therapy By Speech Language Pathologist   Dysphagia Rating   Nutritional Liquid Intake Rating Scale Non thickened beverages   Nutritional Food Intake Rating Scale Total oral diet of a single consistency   Patient / Family Goals   Patient / Family Goal #1 pt stated, \"uh huh\" when asked if she was thirsty   Short Term Goals   Short Term Goal # 1 Pt will safely consume PU4/TN0 free from s/sx aspiration or respiratory compromise given 1:1 feeding.         "

## 2021-10-10 NOTE — CARE PLAN
"The patient is Watcher - Medium risk of patient condition declining or worsening    Shift Goals  Clinical Goals: Improve orientation   Patient Goals: Go home  Family Goals: \"get her better\"    Progress made toward(s) clinical / shift goals:  Pt more alert and oriented today. Family/BF will come sit with patient today to help resolve delirium.     Patient is not progressing towards the following goals: still fatigued and had to have Ativan last night.     Problem: Knowledge Deficit - Standard  Goal: Patient and family/care givers will demonstrate understanding of plan of care, disease process/condition, diagnostic tests and medications  Outcome: Progressing     Problem: Skin Integrity  Goal: Skin integrity is maintained or improved  Outcome: Progressing     Problem: Psychosocial  Goal: Patient's level of anxiety will decrease  Outcome: Progressing         "

## 2021-10-10 NOTE — PROGRESS NOTES
Pulmonary Progress Note    Date of admission  10/4/2021    Chief Complaint  Shortness of breath     Hospital Course  26 year old female with medical H/o asthma and polysubstance abuse is admitted on 10/04 for Acute hypoxic respiratory failure due to asthma exacerbation and needed intubation on 10/05 and s/p extubation on 10/08 receiving solumedrol, nebulizers, Magnesium sulphate and breathing treatments. Pulmonology consulted for asthma management and follow up.     Interval Problem Update  Reviewed last 24 hour events:  Patient is still confused and only responds yes or No. She was soaked in her urine this morning and trying to pull everything. She was saturating on room air.     Review of Systems  ROS   Unable to obtain     Vital Signs for last 24 hours   Temp:  [35.9 °C (96.6 °F)-36.9 °C (98.5 °F)] 36.8 °C (98.3 °F)  Pulse:  [] 74  Resp:  [14-25] 18  BP: (105-143)/(67-89) 105/67  SpO2:  [83 %-99 %] 97 %    Physical Exam   Physical Exam  Constitutional:       Appearance: Normal appearance.      Comments: Appears confused   HENT:      Head: Normocephalic.      Right Ear: Tympanic membrane normal.      Left Ear: Tympanic membrane normal.      Nose: Nose normal.      Mouth/Throat:      Mouth: Mucous membranes are moist.   Eyes:      Extraocular Movements: Extraocular movements intact.      Pupils: Pupils are equal, round, and reactive to light.   Cardiovascular:      Rate and Rhythm: Normal rate and regular rhythm.      Pulses: Normal pulses.      Heart sounds: Normal heart sounds.   Pulmonary:      Effort: Pulmonary effort is normal.      Comments: Inspiratory and expiratory wheezing noted  Abdominal:      General: Bowel sounds are normal.      Palpations: Abdomen is soft.   Musculoskeletal:         General: Normal range of motion.      Cervical back: Normal range of motion.   Skin:     General: Skin is warm.   Neurological:      General: No focal deficit present.   Psychiatric:         Mood and Affect: Mood  normal.           Medications  Current Facility-Administered Medications   Medication Dose Route Frequency Provider Last Rate Last Admin   • LORazepam (ATIVAN) injection 2 mg  2 mg Intravenous Q2HRS PRN Yenifer Lerner M.D.   2 mg at 10/10/21 0341   • methylPREDNISolone (SOLU-MEDROL) 40 MG injection 40 mg  40 mg Intravenous DAILY Nicole Phillips M.D.   40 mg at 10/10/21 0520   • pantoprazole (PROTONIX) injection 40 mg  40 mg Intravenous DAILY Nicole Phillips M.D.   40 mg at 10/10/21 0519   • QUEtiapine (Seroquel) tablet 25 mg  25 mg Oral BID Nicole Phillips M.D.   25 mg at 10/09/21 1719   • enoxaparin (LOVENOX) inj 40 mg  40 mg Subcutaneous DAILY Nicole Phillips M.D.   40 mg at 10/10/21 0520   • montelukast (SINGULAIR) tablet 10 mg  10 mg Oral Nightly Nicole Phillips M.D.       • budesonide-formoterol (SYMBICORT) 160-4.5 MCG/ACT inhaler 2 Puff  2 Puff Inhalation BID (RT) Nicole Phillips M.D.   2 Puff at 10/10/21 0723   • ipratropium-albuterol (DUONEB) nebulizer solution  3 mL Nebulization 4X/DAY (RT) Nicole Phillips M.D.   3 mL at 10/10/21 0723   • albuterol (PROVENTIL) 2.5mg/0.5ml nebulizer solution 2.5 mg  2.5 mg Nebulization Q2HRS PRN Nicole Phillips M.D.   2.5 mg at 10/08/21 1344   • ampicillin/sulbactam (UNASYN) 3 g in  mL IVPB  3 g Intravenous Q6HRS CARLA Cheek.O.   Stopped at 10/10/21 0550   • glucose 4 g chewable tablet 16 g  16 g Oral Q15 MIN PRN Henrry Russell D.OSharron        And   • dextrose 50% (D50W) injection 50 mL  50 mL Intravenous Q15 MIN PRN CARLA Cheek.OSharron   50 mL at 10/09/21 2010   • labetalol (NORMODYNE/TRANDATE) injection 10 mg  10 mg Intravenous Q2HRS PRN Henrry Russell D.O.       • senna-docusate (PERICOLACE or SENOKOT S) 8.6-50 MG per tablet 2 Tablet  2 Tablet Oral BID Robby Ham M.D.   2 Tablet at 10/08/21 0542    And   • polyethylene glycol/lytes (MIRALAX) PACKET 1 Packet  1 Packet Oral QDAY PRN Robby Ham M.D.   1 Packet at 10/07/21 1730    And   • magnesium hydroxide (MILK  OF MAGNESIA) suspension 30 mL  30 mL Oral QDAY PRN Robby Ham M.D.        And   • bisacodyl (DULCOLAX) suppository 10 mg  10 mg Rectal QDAY PRN Robby Ham M.D.       • acetaminophen (Tylenol) tablet 650 mg  650 mg Oral Q6HRS PRN Robby Ham M.D.   650 mg at 10/06/21 0533   • ondansetron (ZOFRAN) syringe/vial injection 4 mg  4 mg Intravenous Q4HRS PRN Robby Ham M.D.   4 mg at 10/07/21 2119   • ondansetron (ZOFRAN ODT) dispertab 4 mg  4 mg Oral Q4HRS PRN Robby Ham M.D.       • guaiFENesin dextromethorphan (ROBITUSSIN DM) 100-10 MG/5ML syrup 10 mL  10 mL Oral Q6HRS PRN Robby Ham M.D.       • albuterol inhaler 2 Puff  2 Puff Inhalation Q2HRS PRN Robby Ham M.D.   2 Puff at 10/08/21 1945   • Respiratory Therapy Consult   Nebulization Continuous RT Robby Ham M.D.       • MD Alert...ICU Electrolyte Replacement per Pharmacy   Other PHARMACY TO DOSE Robby Ham M.D.           Fluids    Intake/Output Summary (Last 24 hours) at 10/10/2021 0804  Last data filed at 10/9/2021 1200  Gross per 24 hour   Intake 55 ml   Output --   Net 55 ml       Laboratory  Recent Labs     10/07/21  0902 10/07/21  1125 10/07/21  1737   ISTATAPH 7.287* 7.270* 7.368*   ISTATAPCO2 47.8* 52.0* 41.9*   ISTATAPO2 89* 109* 115*   ISTATATCO2 24 25 25   MSRVAMZ2YZP 96 97 98   ISTATARTHCO3 22.8 23.9 24.1   ISTATARTBE -4 -3 -1   ISTATTEMP 37.0 C 38.0 C 38.0 C   ISTATFIO2 40 40 40   ISTATSPEC Arterial Arterial Arterial   ISTATAPHTC 7.287* 7.256* 7.353*   LHRVSTTI5DM 89* 116* 121*         Recent Labs     10/08/21  0405 10/09/21  0429 10/10/21  0402   SODIUM 149* 142 139   POTASSIUM 4.7 5.3 3.6   CHLORIDE 116* 107 99   CO2 25 27 28   BUN 19 19 14   CREATININE 0.58 0.46* 0.52   MAGNESIUM 2.7* 2.5 2.1   PHOSPHORUS 2.5 3.0 1.9*   CALCIUM 7.9* 8.5 9.4     Recent Labs     10/08/21  0405 10/09/21  0429 10/10/21  0402   ALTSGPT 18 25 38   ASTSGOT 29 27 37   ALKPHOSPHAT 55 57 66   TBILIRUBIN 0.2 0.2 0.6    GLUCOSE 140* 162* 97     Recent Labs     10/08/21  0405 10/09/21  0429 10/10/21  0402   WBC 20.4* 18.2* 17.4*   ASTSGOT 29 27 37   ALTSGPT 18 25 38   ALKPHOSPHAT 55 57 66   TBILIRUBIN 0.2 0.2 0.6     Recent Labs     10/08/21  0405 10/09/21  0429 10/09/21  1313 10/10/21  0402   RBC 3.06* 3.27*  --  4.33   HEMOGLOBIN 9.4* 10.2*  --  13.5   HEMATOCRIT 30.3* 31.9*  --  40.9   PLATELETCT 231 220  --  305   IRON  --   --  38*  --    FERRITIN  --   --  42.8  --    TOTIRONBC  --   --  265  --        Imaging  X-Ray:  No film today    Assessment/Plan  1. Acute hypoxic respiratory failure   2. Asthma exacerbation   3. Polysubstance abuse     -Patient is clinically stable and saturating well on room air. She still has wheezing on auscultation.   -Continue Solumedrol until more stable, can transition to oral Prednisone once cleared by speech. She already finished 5 days of IV Unasyn.   -Continue LABA/ICS and Montelukast.   -She needs maintenance inhaler with symbicort and as needed albuterol rescue inhaler on discharge.  Can continue montelukast as well.   -Will schedule follow up in pulmonology clinic in 2-3 weeks after discharge.   -Pulmonology will sign off, please don't hesitate to call us for any questions.           I have performed a physical exam and reviewed and updated ROS and Plan today (10/10/2021). In review of yesterday's note (10/9/2021), there are no changes except as documented above.     Discussed patient condition and risk of morbidity and/or mortality with Hospitalist and Patient

## 2021-10-11 VITALS
BODY MASS INDEX: 24.31 KG/M2 | DIASTOLIC BLOOD PRESSURE: 85 MMHG | HEIGHT: 64 IN | OXYGEN SATURATION: 94 % | HEART RATE: 77 BPM | WEIGHT: 142.42 LBS | SYSTOLIC BLOOD PRESSURE: 127 MMHG | TEMPERATURE: 97.3 F | RESPIRATION RATE: 18 BRPM

## 2021-10-11 LAB
ALBUMIN SERPL BCP-MCNC: 3.1 G/DL (ref 3.2–4.9)
ALBUMIN/GLOB SERPL: 0.9 G/DL
ALP SERPL-CCNC: 57 U/L (ref 30–99)
ALT SERPL-CCNC: 43 U/L (ref 2–50)
ANION GAP SERPL CALC-SCNC: 16 MMOL/L (ref 7–16)
AST SERPL-CCNC: 39 U/L (ref 12–45)
BACTERIA BLD CULT: NORMAL
BACTERIA BLD CULT: NORMAL
BILIRUB SERPL-MCNC: 0.3 MG/DL (ref 0.1–1.5)
BUN SERPL-MCNC: 11 MG/DL (ref 8–22)
CALCIUM SERPL-MCNC: 8.4 MG/DL (ref 8.5–10.5)
CHLORIDE SERPL-SCNC: 102 MMOL/L (ref 96–112)
CO2 SERPL-SCNC: 23 MMOL/L (ref 20–33)
CREAT SERPL-MCNC: 0.52 MG/DL (ref 0.5–1.4)
ERYTHROCYTE [DISTWIDTH] IN BLOOD BY AUTOMATED COUNT: 41.1 FL (ref 35.9–50)
GLOBULIN SER CALC-MCNC: 3.4 G/DL (ref 1.9–3.5)
GLUCOSE SERPL-MCNC: 127 MG/DL (ref 65–99)
HCT VFR BLD AUTO: 37.9 % (ref 37–47)
HGB BLD-MCNC: 12.6 G/DL (ref 12–16)
MAGNESIUM SERPL-MCNC: 1.7 MG/DL (ref 1.5–2.5)
MCH RBC QN AUTO: 30.4 PG (ref 27–33)
MCHC RBC AUTO-ENTMCNC: 33.2 G/DL (ref 33.6–35)
MCV RBC AUTO: 91.5 FL (ref 81.4–97.8)
PHOSPHATE SERPL-MCNC: 3 MG/DL (ref 2.5–4.5)
PLATELET # BLD AUTO: 260 K/UL (ref 164–446)
PMV BLD AUTO: 10 FL (ref 9–12.9)
POTASSIUM SERPL-SCNC: 3.4 MMOL/L (ref 3.6–5.5)
PROT SERPL-MCNC: 6.5 G/DL (ref 6–8.2)
RBC # BLD AUTO: 4.14 M/UL (ref 4.2–5.4)
SIGNIFICANT IND 70042: NORMAL
SIGNIFICANT IND 70042: NORMAL
SITE SITE: NORMAL
SITE SITE: NORMAL
SODIUM SERPL-SCNC: 141 MMOL/L (ref 135–145)
SOURCE SOURCE: NORMAL
SOURCE SOURCE: NORMAL
WBC # BLD AUTO: 16.7 K/UL (ref 4.8–10.8)

## 2021-10-11 PROCEDURE — 85027 COMPLETE CBC AUTOMATED: CPT

## 2021-10-11 PROCEDURE — 94760 N-INVAS EAR/PLS OXIMETRY 1: CPT

## 2021-10-11 PROCEDURE — 97166 OT EVAL MOD COMPLEX 45 MIN: CPT

## 2021-10-11 PROCEDURE — 700111 HCHG RX REV CODE 636 W/ 250 OVERRIDE (IP): Performed by: INTERNAL MEDICINE

## 2021-10-11 PROCEDURE — 80053 COMPREHEN METABOLIC PANEL: CPT

## 2021-10-11 PROCEDURE — A9270 NON-COVERED ITEM OR SERVICE: HCPCS | Performed by: INTERNAL MEDICINE

## 2021-10-11 PROCEDURE — 700105 HCHG RX REV CODE 258: Performed by: INTERNAL MEDICINE

## 2021-10-11 PROCEDURE — 97161 PT EVAL LOW COMPLEX 20 MIN: CPT

## 2021-10-11 PROCEDURE — A9270 NON-COVERED ITEM OR SERVICE: HCPCS | Performed by: HOSPITALIST

## 2021-10-11 PROCEDURE — 99239 HOSP IP/OBS DSCHRG MGMT >30: CPT | Performed by: HOSPITALIST

## 2021-10-11 PROCEDURE — 700101 HCHG RX REV CODE 250: Performed by: INTERNAL MEDICINE

## 2021-10-11 PROCEDURE — 36415 COLL VENOUS BLD VENIPUNCTURE: CPT

## 2021-10-11 PROCEDURE — 94640 AIRWAY INHALATION TREATMENT: CPT

## 2021-10-11 PROCEDURE — 700102 HCHG RX REV CODE 250 W/ 637 OVERRIDE(OP): Performed by: HOSPITALIST

## 2021-10-11 PROCEDURE — 92526 ORAL FUNCTION THERAPY: CPT

## 2021-10-11 PROCEDURE — 83735 ASSAY OF MAGNESIUM: CPT

## 2021-10-11 PROCEDURE — 700102 HCHG RX REV CODE 250 W/ 637 OVERRIDE(OP): Performed by: INTERNAL MEDICINE

## 2021-10-11 PROCEDURE — 84100 ASSAY OF PHOSPHORUS: CPT

## 2021-10-11 RX ORDER — ALBUTEROL SULFATE 90 UG/1
2 AEROSOL, METERED RESPIRATORY (INHALATION)
Qty: 8.5 G | Refills: 3 | Status: SHIPPED | OUTPATIENT
Start: 2021-10-11

## 2021-10-11 RX ORDER — MONTELUKAST SODIUM 10 MG/1
10 TABLET ORAL DAILY
Qty: 30 TABLET | Refills: 0 | Status: SHIPPED | OUTPATIENT
Start: 2021-10-11 | End: 2021-11-10

## 2021-10-11 RX ORDER — METHYLPREDNISOLONE SODIUM SUCCINATE 40 MG/ML
40 INJECTION, POWDER, LYOPHILIZED, FOR SOLUTION INTRAMUSCULAR; INTRAVENOUS DAILY
Status: DISCONTINUED | OUTPATIENT
Start: 2021-10-11 | End: 2021-10-11 | Stop reason: HOSPADM

## 2021-10-11 RX ORDER — BUDESONIDE AND FORMOTEROL FUMARATE DIHYDRATE 160; 4.5 UG/1; UG/1
2 AEROSOL RESPIRATORY (INHALATION) 2 TIMES DAILY
Qty: 1 EACH | Refills: 3 | Status: SHIPPED | OUTPATIENT
Start: 2021-10-11 | End: 2021-12-10

## 2021-10-11 RX ORDER — PREDNISONE 20 MG/1
40 TABLET ORAL DAILY
Qty: 10 TABLET | Refills: 0 | Status: SHIPPED | OUTPATIENT
Start: 2021-10-11 | End: 2021-10-16

## 2021-10-11 RX ADMIN — DOCUSATE SODIUM 50 MG AND SENNOSIDES 8.6 MG 2 TABLET: 8.6; 5 TABLET, FILM COATED ORAL at 05:26

## 2021-10-11 RX ADMIN — IPRATROPIUM BROMIDE AND ALBUTEROL SULFATE 3 ML: .5; 2.5 SOLUTION RESPIRATORY (INHALATION) at 11:43

## 2021-10-11 RX ADMIN — IPRATROPIUM BROMIDE AND ALBUTEROL SULFATE 3 ML: .5; 2.5 SOLUTION RESPIRATORY (INHALATION) at 07:18

## 2021-10-11 RX ADMIN — OMEPRAZOLE 20 MG: 20 CAPSULE, DELAYED RELEASE ORAL at 05:26

## 2021-10-11 RX ADMIN — SODIUM CHLORIDE 3 G: 900 INJECTION INTRAVENOUS at 00:24

## 2021-10-11 RX ADMIN — METHYLPREDNISOLONE SODIUM SUCCINATE 40 MG: 40 INJECTION, POWDER, FOR SOLUTION INTRAMUSCULAR; INTRAVENOUS at 09:17

## 2021-10-11 RX ADMIN — BUDESONIDE AND FORMOTEROL FUMARATE DIHYDRATE 2 PUFF: 160; 4.5 AEROSOL RESPIRATORY (INHALATION) at 07:18

## 2021-10-11 RX ADMIN — SODIUM CHLORIDE 3 G: 900 INJECTION INTRAVENOUS at 09:16

## 2021-10-11 RX ADMIN — ENOXAPARIN SODIUM 40 MG: 40 INJECTION SUBCUTANEOUS at 05:26

## 2021-10-11 ASSESSMENT — GAIT ASSESSMENTS
DEVIATION: DECREASED BASE OF SUPPORT
GAIT LEVEL OF ASSIST: SUPERVISED
DISTANCE (FEET): 125

## 2021-10-11 ASSESSMENT — COGNITIVE AND FUNCTIONAL STATUS - GENERAL
SUGGESTED CMS G CODE MODIFIER DAILY ACTIVITY: CJ
HELP NEEDED FOR BATHING: A LITTLE
DAILY ACTIVITIY SCORE: 20
PERSONAL GROOMING: A LITTLE
TURNING FROM BACK TO SIDE WHILE IN FLAT BAD: A LITTLE
CLIMB 3 TO 5 STEPS WITH RAILING: A LITTLE
DRESSING REGULAR LOWER BODY CLOTHING: A LITTLE
STANDING UP FROM CHAIR USING ARMS: A LITTLE
SUGGESTED CMS G CODE MODIFIER MOBILITY: CK
WALKING IN HOSPITAL ROOM: A LITTLE
MOVING FROM LYING ON BACK TO SITTING ON SIDE OF FLAT BED: A LITTLE
MOBILITY SCORE: 18
TOILETING: A LITTLE
MOVING TO AND FROM BED TO CHAIR: A LITTLE

## 2021-10-11 ASSESSMENT — ACTIVITIES OF DAILY LIVING (ADL): TOILETING: INDEPENDENT

## 2021-10-11 NOTE — DISCHARGE PLANNING
Anticipated Discharge Disposition: TBD    Action: pt oriented x2, 6 clicks are 24, pt on room air. Case Management needs to be determined given orientation.    Barriers to Discharge: Orientation, medical clearance    Plan: f/u with medical team, pt and family to discuss dc needs and barriers.    UPDATE: Pt oriented x 4, pt given resources for health care providers and for substance abuse resources for discharge and encouraged to f/u with [facility] for a PCP appointment.

## 2021-10-11 NOTE — DISCHARGE SUMMARY
Discharge Summary    CHIEF COMPLAINT ON ADMISSION  Chief Complaint   Patient presents with   • Asthma     history of, 80% on room air. took albuterol x5 at home and given 3 albuterol by EMS. 2 duonebs given by EMS   • Shortness of Breath     increasing since last monday       Reason for Admission  EMS     Admission Date  10/4/2021    CODE STATUS  Prior    HPI & HOSPITAL COURSE  This is a 26 y.o. female here with history of asthma, polysubstance abuse who presents to the hospital for shortness of breath due to status asthmaticus. Patient presented to the hospital with O2 saturation of 40%.  In the ER she was given albuterol, magnesium, epinephrine, BiPAP and then transition to high flow oxygen.  She was given multiple rounds of IV ketamine.  Eventually her hypoxic and hypercapnic respiratory failure continued to worsen and she was subsequently intubated.  She was started on IV steroids and ketamine infusion in ICU.   After multiple rounds of ketamine IVP, continuous albuterol/heliox, steroids, magnesium, the patient became hypercapnic, more hypoxic and required mechanical ventilation and paralyzed. Patient was extubated on 10/8 and transition to BiPAP. UDS + for benzos, amphetamine, oxycodone, opiates, PCP. She was counseled on abstaining from substance abuse. Patient was discharged with course of steroids and inhalers.       Therefore, she is discharged in good and stable condition to home with close outpatient follow-up.    The patient met 2-midnight criteria for an inpatient stay at the time of discharge.    Discharge Date  10/11/2021    FOLLOW UP ITEMS POST DISCHARGE  Follow up with PCP    DISCHARGE DIAGNOSES  Principal Problem:    Severe asthma with status asthmaticus POA: Unknown  Active Problems:    Acute respiratory failure with hypoxia and hypercapnia (HCC) POA: Unknown    Polysubstance (including opioids) dependence, daily use (HCC) POA: Unknown    Bloody emesis POA: Unknown    Delirium POA: Unknown     Obstipation POA: Unknown    Overweight (BMI 25.0-29.9) POA: Unknown    Anemia POA: Unknown  Resolved Problems:    * No resolved hospital problems. *      FOLLOW UP  Future Appointments   Date Time Provider Department Center   11/11/2021  1:00 PM Ronit Hayden P.A.-C. [facility] None     [address] [id number] [phone]  Go in 1 week  establish primary care provider    Pcp Pt States None            MEDICATIONS ON DISCHARGE     Medication List      START taking these medications      Instructions   albuterol 108 (90 Base) MCG/ACT Aers inhalation aerosol  Replaces: ALBUTEROL SULFATE PO   Inhale 2 Puffs every 2 hours as needed.  Dose: 2 Puff     budesonide-formoterol 160-4.5 MCG/ACT Aero  Commonly known as: SYMBICORT   Inhale 2 Puffs 2 times a day for 60 days.  Dose: 2 Puff     montelukast 10 MG Tabs  Commonly known as: SINGULAIR   Take 1 Tablet by mouth every day for 30 days.  Dose: 10 mg     predniSONE 20 MG Tabs  Commonly known as: DELTASONE   Take 2 Tablets by mouth every day for 5 days.  Dose: 40 mg        CONTINUE taking these medications      Instructions   Mucinex D  MG per tablet  Generic drug: pseudoephedrine-guaifenesin   Take 1 Tablet by mouth every 12 hours.  Dose: 1 Tablet        STOP taking these medications    ALBUTEROL SULFATE PO  Replaced by: albuterol 108 (90 Base) MCG/ACT Aers inhalation aerosol            Allergies  No Known Allergies    DIET  No orders of the defined types were placed in this encounter.      ACTIVITY  As tolerated.  Weight bearing as tolerated    CONSULTATIONS  ICU  Pulmonary    PROCEDURES  Intubation 10/5    LABORATORY  Lab Results   Component Value Date    SODIUM 141 10/11/2021    POTASSIUM 3.4 (L) 10/11/2021    CHLORIDE 102 10/11/2021    CO2 23 10/11/2021    GLUCOSE 127 (H) 10/11/2021    BUN 11 10/11/2021    CREATININE 0.52 10/11/2021        Lab Results   Component Value Date    WBC 16.7 (H) 10/11/2021    HEMOGLOBIN 12.6  10/11/2021    HEMATOCRIT 37.9 10/11/2021    PLATELETCT 260 10/11/2021        Total time of the discharge process exceeds 50 minutes.

## 2021-10-11 NOTE — DISCHARGE INSTRUCTIONS
Asthma Attack Prevention, Adult  Although you may not be able to control the fact that you have asthma, you can take actions to prevent episodes of asthma (asthma attacks). These actions include:  · Creating a written plan for managing and treating your asthma attacks (asthma action plan).  · Monitoring your asthma.  · Avoiding things that can irritate your airways or make your asthma symptoms worse (asthma triggers).  · Taking your medicines as directed.  · Acting quickly if you have signs or symptoms of an asthma attack.  What are some ways to prevent an asthma attack?  Create a plan  Work with your health care provider to create an asthma action plan. This plan should include:  · A list of your asthma triggers and how to avoid them.  · A list of symptoms that you experience during an asthma attack.  · Information about when to take medicine and how much medicine to take.  · Information to help you understand your peak flow measurements.  · Contact information for your health care providers.  · Daily actions that you can take to control asthma.  Monitor your asthma  To monitor your asthma:  · Use your peak flow meter every morning and every evening for 2-3 weeks. Record the results in a journal. A drop in your peak flow numbers on one or more days may mean that you are starting to have an asthma attack, even if you are not having symptoms.  · When you have asthma symptoms, write them down in a journal.    Avoid asthma triggers  Work with your health care provider to find out what your asthma triggers are. This can be done by:  · Being tested for allergies.  · Keeping a journal that notes when asthma attacks occur and what may have contributed to them.  · Asking your health care provider whether other medical conditions make your asthma worse.  Common asthma triggers include:  · Dust.  · Smoke. This includes campfire smoke and secondhand smoke from tobacco products.  · Pet dander.  · Trees, grasses or  pollens.  · Very cold, dry, or humid air.  · Mold.  · Foods that contain high amounts of sulfites.  · Strong smells.  · Engine exhaust and air pollution.  · Aerosol sprays and fumes from household .  · Household pests and their droppings, including dust mites and cockroaches.  · Certain medicines, including NSAIDs.  Once you have determined your asthma triggers, take steps to avoid them. Depending on your triggers, you may be able to reduce the chance of an asthma attack by:  · Keeping your home clean. Have someone dust and vacuum your home for you 1 or 2 times a week. If possible, have them use a high-efficiency particulate arrestance (HEPA) vacuum.  · Washing your sheets weekly in hot water.  · Using allergy-proof mattress covers and casings on your bed.  · Keeping pets out of your home.  · Taking care of mold and water problems in your home.  · Avoiding areas where people smoke.  · Avoiding using strong perfumes or odor sprays.  · Avoid spending a lot of time outdoors when pollen counts are high and on very windy days.  · Talking with your health care provider before stopping or starting any new medicines.  Medicines  Take over-the-counter and prescription medicines only as told by your health care provider. Many asthma attacks can be prevented by carefully following your medicine schedule. Taking your medicines correctly is especially important when you cannot avoid certain asthma triggers. Even if you are doing well, do not stop taking your medicine and do not take less medicine.  Act quickly  If an asthma attack happens, acting quickly can decrease how severe it is and how long it lasts. Take these actions:  · Pay attention to your symptoms. If you are coughing, wheezing, or having difficulty breathing, do not wait to see if your symptoms go away on their own. Follow your asthma action plan.  · If you have followed your asthma action plan and your symptoms are not improving, call your health care  provider or seek immediate medical care at the nearest hospital.  It is important to write down how often you need to use your fast-acting rescue inhaler. You can track how often you use an inhaler in your journal. If you are using your rescue inhaler more often, it may mean that your asthma is not under control. Adjusting your asthma treatment plan may help you to prevent future asthma attacks and help you to gain better control of your condition.  How can I prevent an asthma attack when I exercise?  Exercise is a common asthma trigger. To prevent asthma attacks during exercise:  · Follow advice from your health care provider about whether you should use your fast-acting inhaler before exercising. Many people with asthma experience exercise-induced bronchoconstriction (EIB). This condition often worsens during vigorous exercise in cold, humid, or dry environments. Usually, people with EIB can stay very active by using a fast-acting inhaler before exercising.  · Avoid exercising outdoors in very cold or humid weather.  · Avoid exercising outdoors when pollen counts are high.  · Warm up and cool down when exercising.  · Stop exercising right away if asthma symptoms start.  Consider taking part in exercises that are less likely to cause asthma symptoms such as:  · Indoor swimming.  · Biking.  · Walking.  · Hiking.  · Playing football.  This information is not intended to replace advice given to you by your health care provider. Make sure you discuss any questions you have with your health care provider.  Document Released: 12/06/2010 Document Revised: 11/30/2018 Document Reviewed: 06/03/2017  Elsevier Patient Education © 2020 [employer]    Discharge Instructions    Discharged to home by car with friend. Discharged via wheelchair, hospital escort: Yes.  Special equipment needed: Not Applicable    Be sure to schedule a follow-up appointment with your primary care doctor or any specialists as instructed.     Discharge  Plan:   Diet Plan: Discussed  Activity Level: Discussed  Confirmed Follow up Appointment: Patient to Call and Schedule Appointment  Confirmed Symptoms Management: Discussed  Influenza Vaccine Indication: Indicated: 9 to 64 years of age    I understand that a diet low in cholesterol, fat, and sodium is recommended for good health. Unless I have been given specific instructions below for another diet, I accept this instruction as my diet prescription.   Other diet: regular    Special Instructions: None    · Is patient discharged on Warfarin / Coumadin?   No     Depression / Suicide Risk    As you are discharged from this [facility] facility, it is important to learn how to keep safe from harming yourself.    Recognize the warning signs:  · Abrupt changes in personality, positive or negative- including increase in energy   · Giving away possessions  · Change in eating patterns- significant weight changes-  positive or negative  · Change in sleeping patterns- unable to sleep or sleeping all the time   · Unwillingness or inability to communicate  · Depression  · Unusual sadness, discouragement and loneliness  · Talk of wanting to die  · Neglect of personal appearance   · Rebelliousness- reckless behavior  · Withdrawal from people/activities they love  · Confusion- inability to concentrate     If you or a loved one observes any of these behaviors or has concerns about self-harm, here's what you can do:  · Talk about it- your feelings and reasons for harming yourself  · Remove any means that you might use to hurt yourself (examples: pills, rope, extension cords, firearm)  · Get professional help from the community (Mental Health, Substance Abuse, psychological counseling)  · Do not be alone:Call your Safe Contact- someone whom you trust who will be there for you.  · Call your local CRISIS HOTLINE 789-2303 or 421-340-8046  · Call your local Children's Mobile Crisis Response Team [place] (244) 886-5267 or  www.[employer].mPay Gateway  · Call the toll free National Suicide Prevention Hotlines   · National Suicide Prevention Lifeline 525-132-YXQA (4285)  · National Hope Line Network 800-SUICIDE (338-0817)

## 2021-10-11 NOTE — PROGRESS NOTES
Pt d/c'd home via private car with friend. Information provided on establishing a PCP and drug counseling. All questions and concerns addressed. Pt sent home with all personal belongings.

## 2021-10-11 NOTE — PROGRESS NOTES
Pt is medical, came off tele for shower and not wanting to put back on. MD paged.     12 hr CC done

## 2021-10-11 NOTE — THERAPY
Physical Therapy   Initial Evaluation     Patient Name: Jessica Jimenez  Age:  26 y.o., Sex:  female  Medical Record #: [id number]  Today's Date: 10/11/2021     Precautions  Precautions: Fall Risk    Assessment  Patient is 26 y.o. female admitted for status asthmaticus with 40% SpO2 and polysubstance abuse. Pt initially required min assist for gait but progressed during evaluation to SPV with no AD. Pt is slightly unsteady but did not need assistance from therapist to prevent falls. Pt encouraged to get assist from boyfriend at dc. No further acute PT needs.     Plan    Recommend Physical Therapy for Evaluation only     DC Equipment Recommendations: None  Discharge Recommendations: Anticipate that the patient will have no further physical therapy needs after discharge from the hospital (with boyfriend helping)          10/11/21 1006   Prior Living Situation   Prior Services Home-Independent   Housing / Facility 1 Story Apartment / Condo   Steps Into Home 0   Steps In Home 0   Elevator Yes   Bathroom Set up Bathtub / Shower Combination   Equipment Owned None   Lives with - Patient's Self Care Capacity Significant Other   Comments lives with boyfriend who can assist   Prior Level of Functional Mobility   Bed Mobility Independent   Transfer Status Independent   Ambulation Independent   Distance Ambulation (Feet)   (community)   Assistive Devices Used None   Stairs Independent   Comments independent and working as a    Cognition    Level of Consciousness Alert   Active ROM Lower Body    Active ROM Lower Body  WDL   Strength Lower Body   Lower Body Strength  WDL   Sensation Lower Body   Lower Extremity Sensation   WDL   Balance Assessment   Sitting Balance (Static) Fair +   Sitting Balance (Dynamic) Fair   Standing Balance (Static) Fair   Standing Balance (Dynamic) Fair   Weight Shift Sitting Fair   Weight Shift Standing Fair   Comments no AD, unsteady but no assist needed to prevent falls   Gait Analysis   Gait Level  Of Assist Supervised   Assistive Device None   Distance (Feet) 125   # of Times Distance was Traveled 1   Deviation Decreased Base Of Support   # of Stairs Climbed 0   Weight Bearing Status no restrictions   Comments initially min assist but progressed to SPV with increased distance   Bed Mobility    Supine to Sit Supervised   Sit to Supine Supervised   Scooting Supervised   Functional Mobility   Sit to Stand Supervised   Bed, Chair, Wheelchair Transfer Supervised   Transfer Method Stand Step   Mobility no AD in hallway   Anticipated Discharge Equipment and Recommendations   DC Equipment Recommendations None   Discharge Recommendations Anticipate that the patient will have no further physical therapy needs after discharge from the hospital  (with boyfriend helping)

## 2021-10-11 NOTE — PROGRESS NOTES
Report received from KANU Tam. Patient is alert and oriented times 3, unsure of the date.  Patient is on room air and denies pain. Has poor safety awareness. Patient educated on fall risk and fall precautions.  Call light is in reach. Safety measures are in place.

## 2021-10-11 NOTE — THERAPY
Occupational Therapy   Initial Evaluation     Patient Name: Jessica Jimenez  Age:  26 y.o., Sex:  female  Medical Record #: [id number]  Today's Date: 10/11/2021     Precautions  Precautions: Fall Risk, Swallow Precautions ( See Comments)    Assessment  Patient is 26 y.o. female admitted for status asthmaticus w/ 40% SpO2 with polysubstance on board and hematemesis. PMHx of asthma and polysubstance abuse. Completed ADLs/txfs with min A- SPV and functional ambulation w/o AD and intermittent min A for balance. Reports lives with boyfriend who is available to assist PRN 24/7. Currently limited by decreased functional mobility, activity tolerance, cognition, coordination, and balance which are currently affecting pt's ability to complete ADLs/IADLs at baseline. Will continue to follow.     Plan    Recommend Occupational Therapy 4 times per week until therapy goals are met for the following treatments:  Adaptive Equipment, Neuro Re-Education / Balance, Self Care/Activities of Daily Living, Therapeutic Activities and Therapeutic Exercises.    DC Equipment Recommendations: Tub / Shower Seat  Discharge Recommendations: Anticipate that the patient will have no further occupational therapy needs after discharge from the hospital (likely will improve while in house and as long as boyfriend is able to assist pt PRN 24/7)      Objective     10/11/21 1009   Prior Living Situation   Prior Services Home-Independent   Housing / Facility 3 Story Apartment / Condo   Elevator Yes   Bathroom Set up Bathtub / Shower Combination   Equipment Owned None   Lives with - Patient's Self Care Capacity Significant Other   Comments Reports lives with boyfriend who is available to assist PRN 24/7   Prior Level of ADL Function   Self Feeding Independent   Grooming / Hygiene Independent   Bathing Independent   Dressing Independent   Toileting Independent   Prior Level of IADL Function   Medication Management Independent   Laundry Independent   Kitchen  Mobility Independent   Finances Independent   Home Management Independent   Shopping Independent   Prior Level Of Mobility Independent Without Device in Community;Independent Without Device in Home   Driving / Transportation Driving Independent   Occupation (Pre-Hospital Vocational) Employed Full Time  ()   Precautions   Precautions Fall Risk;Swallow Precautions ( See Comments)   Vitals   O2 Delivery Device None - Room Air   Pain 0 - 10 Group   Therapist Pain Assessment Post Activity Pain Same as Prior to Activity;During Activity;0;Nurse Notified   Cognition    Cognition / Consciousness X   Level of Consciousness Alert   Safety Awareness Impaired   New Learning Impaired   Comments pleasant and cooperative; possible decreased cognition d/t drug use of per RN meds given while in house   Passive ROM Upper Body   Passive ROM Upper Body WDL   Active ROM Upper Body   Active ROM Upper Body  WDL   Strength Upper Body   Upper Body Strength  WDL   Coordination Upper Body   Coordination X   Comments slight FM coordination deficit, but able to compensate   Balance Assessment   Sitting Balance (Static) Fair +   Sitting Balance (Dynamic) Fair   Standing Balance (Static) Fair   Standing Balance (Dynamic) Fair -   Weight Shift Sitting Fair   Weight Shift Standing Fair   Comments w/o AD and intermittent min A for LOB in BR   Bed Mobility    Supine to Sit Supervised   Sit to Supine Supervised   Scooting Supervised   Comments HOB slightly elevated   ADL Assessment   Grooming Supervision;Standing  (washing hands)   Lower Body Dressing Supervision   Toileting Supervision   Comments CGA for balance at sink   How much help from another person does the patient currently need...   Putting on and taking off regular lower body clothing? 3   Bathing (including washing, rinsing, and drying)? 3   Toileting, which includes using a toilet, bedpan, or urinal? 3   Putting on and taking off regular upper body clothing? 4   Taking care of  personal grooming such as brushing teeth? 3   Eating meals? 4   6 Clicks Daily Activity Score 20   Functional Mobility   Sit to Stand Supervised   Bed, Chair, Wheelchair Transfer Supervised   Toilet Transfers Minimal Assist  (for safety; v/cs)   Transfer Method Slide Board   Mobility w/o AD; improved throughout session- able to self correct by end of session in hallway   Edema / Skin Assessment   Edema / Skin  Not Assessed   Activity Tolerance   Comments reported fatigue at end of session   Patient / Family Goals   Patient / Family Goal #1 to go home   Short Term Goals   Short Term Goal # 1 toilet txf with SPV and no LOB   Short Term Goal # 2 tub txf sim with SPV   Short Term Goal # 3 standing g/h w/o LOB   Education Group   Education Provided Home Safety;Role of Occupational Therapist;Activities of Daily Living;Pathology of bedrest   Role of Occupational Therapist Patient Response Patient;Acceptance;Explanation;Verbal Demonstration;Reinforcement Needed   Home Safety Patient Response Patient;Acceptance;Explanation;Reinforcement Needed;No Learning Evidence   ADL Patient Response Patient;Acceptance;Explanation;Reinforcement Needed;No Learning Evidence   Pathology of Bedrest Patient Response Patient;Acceptance;Explanation;Reinforcement Needed;No Learning Evidence

## 2021-10-11 NOTE — THERAPY
"Speech Language Pathology  Daily Treatment     Patient Name: Jessica Jimenez  Age:  26 y.o., Sex:  female  Medical Record #: [id number]  Today's Date: 10/11/2021     Precautions  Precautions: (P) Fall Risk    Assessment    Patient seen as a high follow up on this date for dysphagia intervention.  Patient sitting up in bed upon entering the room.  Vocal quality characterized as strong and clear at the conversational level.  Patient stated she was hoping to discharge home today.  Currently on a puree diet.  Presentation of PO included pudding, soft solids, mixed consistencies, regular textures, and thin liquids.  Patient fed herself independently with min cues to reduce rate and bolus size.  She presented with functional mastication and bolus manipulation, timely initiation of swallow trigger, and presumed complete hyo-laryngeal elevation and excursion upon palpation.  Patient maintained clear vocal quality throughout the session and had no overt s/sx of aspiration with any consistency consumed.      Recommendation diet upgrade to regular/thins as tolerated.  SLP to follow up x1.      Plan    Continue current treatment plan.    Discharge Recommendations: (P) Anticipate that the patient will have no further speech therapy needs after discharge from the hospital    Objective       10/11/21 1040   Dysphagia    Dysphagia X   Positioning / Behavior Modification Self Monitoring;Modulate Rate or Bite Size   Diet / Liquid Recommendation Regular (7);Thin (0)   Nutritional Liquid Intake Rating Scale Non thickened beverages   Nutritional Food Intake Rating Scale Total oral diet with no restrictions   Nursing Communication Swallow Precaution Sign Posted at Head of Bed   Skilled Intervention Verbal Cueing   Recommended Route of Medication Administration   Medication Administration  Whole with Liquid Wash   Patient / Family Goals   Patient / Family Goal #1 pt stated, \"uh huh\" when asked if she was thirsty   Goal #1 Outcome Progressing " as expected   Short Term Goals   Short Term Goal # 1 Pt will safely consume PU4/TN0 free from s/sx aspiration or respiratory compromise given 1:1 feeding.   Goal Outcome # 1 Goal met, new goal added   Short Term Goal # 1 B  Pt will independently consume RG7/TN0 diet with no overt s/sx of aspiration.